# Patient Record
Sex: FEMALE | Race: BLACK OR AFRICAN AMERICAN | NOT HISPANIC OR LATINO | Employment: FULL TIME | URBAN - METROPOLITAN AREA
[De-identification: names, ages, dates, MRNs, and addresses within clinical notes are randomized per-mention and may not be internally consistent; named-entity substitution may affect disease eponyms.]

---

## 2018-02-28 ENCOUNTER — TRANSCRIBE ORDERS (OUTPATIENT)
Dept: URGENT CARE | Facility: MEDICAL CENTER | Age: 33
End: 2018-02-28

## 2018-02-28 ENCOUNTER — APPOINTMENT (OUTPATIENT)
Dept: URGENT CARE | Facility: MEDICAL CENTER | Age: 33
End: 2018-02-28

## 2018-02-28 DIAGNOSIS — Z00.00 PHYSICAL EXAM: Primary | ICD-10-CM

## 2018-02-28 DIAGNOSIS — Z00.00 PHYSICAL EXAM: ICD-10-CM

## 2018-02-28 PROCEDURE — 86480 TB TEST CELL IMMUN MEASURE: CPT

## 2018-03-02 LAB
ANNOTATION COMMENT IMP: NORMAL
GAMMA INTERFERON BACKGROUND BLD IA-ACNC: 0.05 IU/ML
M TB IFN-G BLD-IMP: NEGATIVE
M TB IFN-G CD4+ BCKGRND COR BLD-ACNC: 0 IU/ML
M TB IFN-G CD4+ T-CELLS BLD-ACNC: 0.05 IU/ML
MITOGEN IGNF BLD-ACNC: 7.38 IU/ML
QUANTIFERON-TB GOLD IN TUBE: NORMAL
SERVICE CMNT-IMP: NORMAL

## 2018-03-15 ENCOUNTER — OFFICE VISIT (OUTPATIENT)
Dept: OBGYN CLINIC | Facility: CLINIC | Age: 33
End: 2018-03-15
Payer: COMMERCIAL

## 2018-03-15 VITALS
SYSTOLIC BLOOD PRESSURE: 109 MMHG | DIASTOLIC BLOOD PRESSURE: 74 MMHG | BODY MASS INDEX: 32.62 KG/M2 | HEART RATE: 81 BPM | HEIGHT: 66 IN | WEIGHT: 203 LBS

## 2018-03-15 DIAGNOSIS — B96.89 BACTERIAL VAGINITIS: ICD-10-CM

## 2018-03-15 DIAGNOSIS — N89.8 VAGINAL DISCHARGE: Primary | ICD-10-CM

## 2018-03-15 DIAGNOSIS — N76.0 BACTERIAL VAGINITIS: ICD-10-CM

## 2018-03-15 PROCEDURE — 87591 N.GONORRHOEAE DNA AMP PROB: CPT | Performed by: FAMILY MEDICINE

## 2018-03-15 PROCEDURE — 99213 OFFICE O/P EST LOW 20 MIN: CPT | Performed by: OBSTETRICS & GYNECOLOGY

## 2018-03-15 PROCEDURE — 87491 CHLMYD TRACH DNA AMP PROBE: CPT | Performed by: FAMILY MEDICINE

## 2018-03-15 RX ORDER — METRONIDAZOLE 500 MG/1
500 TABLET ORAL EVERY 8 HOURS SCHEDULED
Qty: 21 TABLET | Refills: 0 | Status: SHIPPED | OUTPATIENT
Start: 2018-03-15 | End: 2018-03-22

## 2018-03-15 RX ORDER — FLUCONAZOLE 150 MG/1
TABLET ORAL
Qty: 2 TABLET | Refills: 0 | Status: SHIPPED | OUTPATIENT
Start: 2018-03-15 | End: 2018-03-20

## 2018-03-15 NOTE — PROGRESS NOTES
Ms Angelnia Estrada is a 27 y o  yo female who presents for  Vagina discharge w/ odor, that started 2-3 days ago  The patient denies any dysuria  She denies any intercourse in the last 24 hrs  ROS:   She denies hematuria, dysuria, constipation, diarrhea, fevers, chills, nausea or emesis  No past medical history on file  Social History     Social History    Marital status: Single     Spouse name: N/A    Number of children: N/A    Years of education: N/A     Occupational History    Not on file  Social History Main Topics    Smoking status: Former Smoker     Years: 2 00     Types: Cigars     Quit date: 3/15/2005    Smokeless tobacco: Never Used      Comment: black and milds, occassionally    Alcohol use Yes      Comment: occasionally    Drug use: No    Sexual activity: Yes     Partners: Male     Birth control/ protection: Condom Male     Other Topics Concern    Not on file     Social History Narrative    No narrative on file       /74 (BP Location: Left arm, Patient Position: Sitting, Cuff Size: Adult)   Pulse 81   Ht 5' 6" (1 676 m)   Wt 92 1 kg (203 lb)   LMP 03/04/2018   BMI 32 77 kg/m²     GEN: The patient was alert and oriented x3, pleasant well-appearing female in no acute distress  Pelvic: Normal appearing external female genitalia, normal vaginal epithelium  Normal appearing cervix  Bimanual: No CMT   Wet prep was obtained  Cultures for gonorrhea and chlamydia collected  Wet Prep:  pH +,  Clue cells present, Hyphae Present, Trichomonas (-)    A/P:  27 y o  yo female with vagina discharge    1    Bacterial Vaginitis w/ yeast infection  - Start Metronidazole 500mg every 8 hrs  X 7days  - Start Fluconazole 150mg 1 tablet today and 1 in 3 days    - Encourage Pro biotics  - Sent for Chlamydia and GC

## 2018-03-23 LAB
CHLAMYDIA DNA CVX QL NAA+PROBE: NORMAL
N GONORRHOEA DNA GENITAL QL NAA+PROBE: NORMAL

## 2018-03-26 ENCOUNTER — TELEPHONE (OUTPATIENT)
Dept: OBGYN CLINIC | Facility: CLINIC | Age: 33
End: 2018-03-26

## 2018-03-26 ENCOUNTER — OFFICE VISIT (OUTPATIENT)
Dept: OBGYN CLINIC | Facility: CLINIC | Age: 33
End: 2018-03-26
Payer: COMMERCIAL

## 2018-03-26 VITALS
HEIGHT: 66 IN | HEART RATE: 69 BPM | BODY MASS INDEX: 32.66 KG/M2 | WEIGHT: 203.2 LBS | DIASTOLIC BLOOD PRESSURE: 77 MMHG | SYSTOLIC BLOOD PRESSURE: 124 MMHG

## 2018-03-26 DIAGNOSIS — Z87.42 HISTORY OF ABNORMAL CERVICAL PAP SMEAR: ICD-10-CM

## 2018-03-26 DIAGNOSIS — Z01.419 ENCOUNTER FOR GYNECOLOGICAL EXAMINATION WITHOUT ABNORMAL FINDING: Primary | ICD-10-CM

## 2018-03-26 PROCEDURE — G0145 SCR C/V CYTO,THINLAYER,RESCR: HCPCS | Performed by: NURSE PRACTITIONER

## 2018-03-26 PROCEDURE — 87624 HPV HI-RISK TYP POOLED RSLT: CPT | Performed by: NURSE PRACTITIONER

## 2018-03-26 PROCEDURE — 99395 PREV VISIT EST AGE 18-39: CPT | Performed by: NURSE PRACTITIONER

## 2018-03-26 NOTE — PROGRESS NOTES
ASSESSMENT & PLAN: David Bales is a 27 y o     with normal gynecologic exam     1   Routine well woman exam done today  2  Pap and HPV:  The patient's last pap was 2015  Pap and cotesting was done today  Current ASCCP Guidelines reviewed  If pap and cotest negative next   testing would be in  5 years  3  The following were reviewed in today's visit: breast self exam and STD testing  4  To RTO if desires contraception  5 RTO if symptomatic for BV again  CC:  Annual Gynecologic Examination    HPI: David Bales is a 27 y o  G4  who presents for annual gynecologic examination  She has had 2 vaginal deliveries and denies any  Medical problems with her pregnancies  She has the following concerns:  She was treated for bacterial vaginosis on 2018 but she only took 5 days of treatment because she was busy in for about  She denies any current symptoms, denies any discharge or vaginal odor  She had cultures for Chlamydia and gonorrhea done at that time and I reviewed that  Both were negative  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, she works the night shift  History of abnormal Pap in , see history below  Health Maintenance:    Menses are regular, moderate flow, not bothersome  She has used the NuvaRing and OCPs for contraception in the past, currently she is not in a relationship  States she she will use condoms when she is sexually active  She preforms monthly self breast exams  She feels safe at home  History reviewed  No pertinent past medical history  Past Surgical History:   Procedure Laterality Date    TOE SURGERY Right        Past OB/Gyn History:  OB History     No data available        Pt does not have menstrual issues      History of sexually transmitted infection: No   History of abnormal pap smears: Yes , patient had an ASCUS Pap positive HPV 2014, had a colpo with impression of YANDY 1 with an ECC that showed no dysplasia, she had a Pap done 05/2015 which was negative with negative high-risk HPV  She is due for Pap today  Patient is not currently sexually active  heterosexual   The current method of family planning is condoms     Family History   Problem Relation Age of Onset    No Known Problems Mother     No Known Problems Father     Diabetes Maternal Grandmother        Social History:  Social History     Social History    Marital status: Single     Spouse name: N/A    Number of children: N/A    Years of education: N/A     Occupational History    Not on file  Social History Main Topics    Smoking status: Former Smoker     Years: 2 00     Types: Cigars     Quit date: 3/15/2005    Smokeless tobacco: Never Used      Comment: black and milds, occassionally    Alcohol use Yes      Comment: occasionally    Drug use: No    Sexual activity: Not Currently     Partners: Male     Birth control/ protection: None     Other Topics Concern    Not on file     Social History Narrative    No narrative on file     Presently lives with family  Patient is single  Patient is currently employed     No Known Allergies    No current outpatient prescriptions on file  Review of Systems  Constitutional :no fever, feels well, no tiredness, no recent weight gain or loss  ENT: no ear ache, no loss of hearing, no nosebleeds or nasal discharge, no sore throat or hoarseness  Cardiovascular: no complaints of slow or fast heart beat, no chest pain, no palpitations, no leg claudication or lower extremity edema  Respiratory: no complaints of shortness of shortness of breath, no HANDLEY  Breasts:no complaints of breast pain, breast lump, or nipple discharge  Gastrointestinal: no complaints of abdominal pain, constipation, nausea, vomiting, or diarrhea or bloody stools  Genitourinary : no complaints of dysuria, incontinence, pelvic pain, no dysmenorrhea, vaginal discharge or abnormal vaginal bleeding and as noted in HPI    Integumentary: no complaints of skin rash or lesion, itching or dry skin  Neurological: no complaints of headache, no confusion, no numbness or tingling, no dizziness or fainting    Objective      /77 (BP Location: Left arm, Patient Position: Sitting, Cuff Size: Adult)   Pulse 69   Ht 5' 6" (1 676 m)   Wt 92 2 kg (203 lb 3 2 oz)   LMP 03/04/2018   Breastfeeding? No   BMI 32 80 kg/m²   General:   appears stated age, cooperative, alert normal mood and affect   Neck: normal, supple,trachea midline, no masses   Heart: regular rate and rhythm, S1, S2 normal, no murmur, click, rub or gallop   Lungs: clear to auscultation bilaterally   Breasts: normal appearance, no masses or tenderness, bilateral nipple piercing  Abdomen: soft, non-tender, without masses or organomegaly   Vulva: normal female genitalia, no lesions   Vagina: normal vagina, no discharge, exudate, lesion, or erythema   Urethra: normal   Cervix: Normal, no discharge  PAP done     Uterus: normal size, contour, position, consistency, mobility, non-tender and anteverted   Adnexa: normal adnexa

## 2018-03-26 NOTE — TELEPHONE ENCOUNTER
----- Message from Roxane Reeder MD sent at 3/24/2018  4:50 PM EDT -----  Please call patient to notify her of normal results  Thank you!

## 2018-03-28 LAB — HPV RRNA GENITAL QL NAA+PROBE: NORMAL

## 2018-03-29 LAB
LAB AP GYN PRIMARY INTERPRETATION: NORMAL
LAB AP LMP: NORMAL
Lab: NORMAL

## 2018-06-09 ENCOUNTER — OFFICE VISIT (OUTPATIENT)
Dept: URGENT CARE | Age: 33
End: 2018-06-09
Payer: COMMERCIAL

## 2018-06-09 VITALS
SYSTOLIC BLOOD PRESSURE: 120 MMHG | TEMPERATURE: 98.2 F | RESPIRATION RATE: 18 BRPM | HEART RATE: 80 BPM | BODY MASS INDEX: 33.2 KG/M2 | OXYGEN SATURATION: 98 % | WEIGHT: 206.6 LBS | HEIGHT: 66 IN | DIASTOLIC BLOOD PRESSURE: 90 MMHG

## 2018-06-09 DIAGNOSIS — R53.83 FATIGUE, UNSPECIFIED TYPE: ICD-10-CM

## 2018-06-09 DIAGNOSIS — R00.2 HEART PALPITATIONS: Primary | ICD-10-CM

## 2018-06-09 LAB
ATRIAL RATE: 71 BPM
P AXIS: 35 DEGREES
PR INTERVAL: 204 MS
QRS AXIS: 17 DEGREES
QRSD INTERVAL: 72 MS
QT INTERVAL: 392 MS
QTC INTERVAL: 425 MS
T WAVE AXIS: 18 DEGREES
VENTRICULAR RATE: 71 BPM

## 2018-06-09 PROCEDURE — 93005 ELECTROCARDIOGRAM TRACING: CPT | Performed by: PHYSICIAN ASSISTANT

## 2018-06-09 PROCEDURE — 93010 ELECTROCARDIOGRAM REPORT: CPT | Performed by: INTERNAL MEDICINE

## 2018-06-09 PROCEDURE — 99213 OFFICE O/P EST LOW 20 MIN: CPT | Performed by: FAMILY MEDICINE

## 2018-06-09 PROCEDURE — S9088 SERVICES PROVIDED IN URGENT: HCPCS | Performed by: FAMILY MEDICINE

## 2018-06-09 NOTE — PROGRESS NOTES
[facility]        NAME: My Donald is a 27 y o  female  : 1985    MRN: [id number]  DATE: 2018  TIME: 4:48 PM    Assessment and Plan   Heart palpitations [R00 2]  1  Heart palpitations  POCT ECG   2  Fatigue, unspecified type           Patient Instructions     Follow up with PCP in 3-5 days  Proceed to  ER if symptoms worsen  Chief Complaint     Chief Complaint   Patient presents with    Palpitations     c/o headache with occ  palpitations x 6 (lasting few seconds) with occ  diziness  Has been working since 11 pm last night until 3 pm today - 2 jobs  Has had 2 cups coffee and 2 double expressos  Also had epistaxis L nare today - resolved with pressure and ice after 5 minutes   Nose Bleed         History of Present Illness       Patient presents with complaints of her palpitations and fatigue  Patient states she went to work Friday night and did not get off until this morning at 8:00 a m  [place] She then went directly to her 2nd job at [employer]   She felt extremely tired at work today and was drinking an excessive amount of caffeine to stay awake  Throughout the day she had 5 episodes of palpitations  She states that only lasted a few seconds each time  She denies chest pain or shortness of breath  She denies history of any cardiac conditions or arrhythmias  Patient's main concern is she is supposed report to work at [employer]  at [facility] and does not feel she is able to go due to fatigue  Review of Systems   Review of Systems   Constitutional: Negative  HENT: Negative  Eyes: Negative  Respiratory: Negative  Cardiovascular: Positive for palpitations  Negative for chest pain and leg swelling  Gastrointestinal: Negative  Neurological: Negative  Current Medications     No current outpatient prescriptions on file      Current Allergies     Allergies as of 2018    (No Known Allergies)            The following portions of the patient's history were reviewed and updated as appropriate: allergies, current medications, past family history, past medical history, past social history, past surgical history and problem list     Objective   /90 (BP Location: Right arm, Patient Position: Sitting, Cuff Size: Standard)   Pulse 80   Temp 98 2 °F (36 8 °C) (Oral)   Resp 18   Ht 5' 6" (1 676 m)   Wt 93 7 kg (206 lb 9 6 oz)   LMP 06/04/2018 (Exact Date)   SpO2 98%   BMI 33 35 kg/m²        Physical Exam     Physical Exam   Constitutional: She appears well-developed and well-nourished  No distress  Cardiovascular: Normal rate and regular rhythm      Pulmonary/Chest: Effort normal and breath sounds normal        EKG:  Normal sinus rhythm no ischemic changes

## 2018-06-09 NOTE — PATIENT INSTRUCTIONS
Heart palpitations and fatigue:  Likely due to lack of sleep and excessive caffeine intake  Advised patient to go home get a good night's rest   If any symptoms persist, she is to go to the emergency room  EKG was within normal in our office  Heart Palpitations   WHAT YOU NEED TO KNOW:   Heart palpitations are feelings that your heart races, jumps, throbs, or flutters  You may feel extra beats, no beats for a short time, or skipped beats  You may have these feelings in your chest, throat, or neck  They may happen when you are sitting, standing, or lying  Heart palpitations may be frightening, but are usually not caused by a serious problem  DISCHARGE INSTRUCTIONS:   Call 911 or have someone else call for any of the following:   · You have any of the following signs of a heart attack:      ¨ Squeezing, pressure, or pain in your chest that lasts longer than 5 minutes or returns    ¨ Discomfort or pain in your back, neck, jaw, stomach, or arm     ¨ Trouble breathing    ¨ Nausea or vomiting    ¨ Lightheadedness or a sudden cold sweat, especially with chest pain or trouble breathing    · You have any of the following signs of a stroke:      ¨ Numbness or drooping on one side of your face     ¨ Weakness in an arm or leg    ¨ Confusion or difficulty speaking    ¨ Dizziness, a severe headache, or vision loss    · You faint or lose consciousness  Return to the emergency department if:   · Your palpitations happen more often or get more intense  Contact your healthcare provider if:   · You have new or worsening swelling in your feet or ankles  · You have questions or concerns about your condition or care  Follow up with your healthcare provider as directed: You may need to follow up with a cardiologist  Iesha Schneider may need tests to check for heart problems that cause palpitations  Write down your questions so you remember to ask them during your visits     Keep a record:  Write down when your palpitations start and stop, what you were doing when they started, and your symptoms  Keep track of what you ate or drank within a few hours of your palpitations  Include anything that seemed to help your symptoms, such as lying down or holding your breath  This record will help you and your healthcare provider learn what triggers your palpitations  Bring this record with you to your follow up visits  Help prevent heart palpitations:   · Manage stress and anxiety  Find ways to relax such as listening to music, meditating, or doing yoga  Exercise can also help decrease stress and anxiety  Talk to someone you trust about your stress or anxiety  You can also talk to a therapist      · Get plenty of sleep every night  Ask your healthcare provider how much sleep you need each night  · Do not drink caffeine or alcohol  Caffeine and alcohol can make your palpitations worse  Caffeine is found in soda, coffee, tea, chocolate, and drinks that increase your energy  · Do not smoke  Nicotine and other chemicals in cigarettes and cigars may damage your heart and blood vessels  Ask your healthcare provider for information if you currently smoke and need help to quit  E-cigarettes or smokeless tobacco still contain nicotine  Talk to your healthcare provider before you use these products  · Do not use illegal drugs  Talk to your healthcare provider if you use illegal drugs and want help to quit  © 2017 [employer]  Information is for End User's use only and may not be sold, redistributed or otherwise used for commercial purposes  All illustrations and images included in CareNotes® are the copyrighted property of [employer]  or [employer]  The above information is an  only  It is not intended as medical advice for individual conditions or treatments  Talk to your doctor, nurse or pharmacist before following any medical regimen to see if it is safe and effective for you

## 2018-06-09 NOTE — LETTER
June 9, 2018     Patient: Erik Medina   YOB: 1985   Date of Visit: 6/9/2018       To Whom It May Concern: It is my medical opinion that Kira Al may return to work on 06/10/2018  If you have any questions or concerns, please don't hesitate to call           Sincerely,        Yaakov Giron PA-C    CC: No Recipients

## 2018-06-22 ENCOUNTER — HOSPITAL ENCOUNTER (EMERGENCY)
Facility: HOSPITAL | Age: 33
Discharge: HOME | End: 2018-06-22
Admitting: EMERGENCY MEDICINE
Payer: COMMERCIAL

## 2018-06-22 VITALS
HEIGHT: 66 IN | BODY MASS INDEX: 33.11 KG/M2 | HEART RATE: 81 BPM | TEMPERATURE: 98.2 F | SYSTOLIC BLOOD PRESSURE: 147 MMHG | DIASTOLIC BLOOD PRESSURE: 94 MMHG | WEIGHT: 206 LBS | OXYGEN SATURATION: 100 % | RESPIRATION RATE: 18 BRPM

## 2018-06-22 DIAGNOSIS — S39.012A BACK STRAIN, INITIAL ENCOUNTER: Primary | ICD-10-CM

## 2018-06-22 PROCEDURE — 99283 EMERGENCY DEPT VISIT LOW MDM: CPT

## 2018-06-22 RX ORDER — NAPROXEN 500 MG/1
500 TABLET ORAL 2 TIMES DAILY WITH MEALS
Qty: 12 TABLET | Refills: 0 | Status: SHIPPED | OUTPATIENT
Start: 2018-06-22 | End: 2018-06-28

## 2018-06-22 RX ORDER — METHOCARBAMOL 500 MG/1
500 TABLET, FILM COATED ORAL 2 TIMES DAILY
Qty: 12 TABLET | Refills: 0 | Status: SHIPPED | OUTPATIENT
Start: 2018-06-22 | End: 2018-06-28

## 2018-06-22 NOTE — ED PROVIDER NOTES
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 when she believes she hurt her back  She states she was assisting a patient moved when the patient slipped and she caught her  She is unsure if she felt any pain in the moment but as her shift went on she developed pain  She states she was on a 1-1 sitting for a few hours and then she started to feel the pain  She states pain is located in her upper back underneath her shoulder blades and in her lower back  She describes a constant achy sore pain that can be sharp when she moves  She states pain is somewhat improved by sitting  She has not taken any medications for her symptoms  She just ended her shift and came here for evaluation  She states she is has hurt her back in the past but this was 8 years ago  She had no surgery  She denies any fall or trauma to her back  She denies any radiation of pain  She denies any numbness or weakness  She denies any saddle anesthesia  She denies bladder or bowel dysfunction  She denies fever, chills, nausea vomiting diarrhea, chest pain, shortness breath, abdominal pain, rash, recent URI symptoms or illness, dysuria, hematuria, history of IV drug use, history of cancer  None       History reviewed  No pertinent past medical history  Past Surgical History:   Procedure Laterality Date    CORRECTION HAMMER TOE Right     TOE SURGERY Right 2005       Family History   Problem Relation Age of Onset    No Known Problems Mother     No Known Problems Father     Diabetes Maternal Grandmother      I have reviewed and agree with the history as documented      Social History   Substance Use Topics    Smoking status: Former Smoker     Years: 2 00     Types: Cigars     Quit date: 3/15/2005    Smokeless tobacco: Never Used      Comment: black and milds, occassionally    Alcohol use 1 2 oz/week     2 Glasses of wine per week      Comment: monthly        Review of Systems   Constitutional: Negative for chills and fever  HENT: Negative for congestion and sore throat  Respiratory: Negative for cough and shortness of breath  Cardiovascular: Negative for chest pain  Gastrointestinal: Negative for abdominal pain, diarrhea, nausea and vomiting  Genitourinary: Negative for difficulty urinating, dysuria, flank pain and hematuria  Musculoskeletal: Positive for back pain  Negative for neck pain and neck stiffness  Skin: Negative for rash  Neurological: Negative for weakness and numbness  All other systems reviewed and are negative  Physical Exam  Physical Exam   Constitutional: She is oriented to person, place, and time  Vital signs are normal  She appears well-developed and well-nourished  She is active  Non-toxic appearance  No distress  HENT:   Head: Normocephalic and atraumatic  Right Ear: Hearing, tympanic membrane, external ear and ear canal normal    Left Ear: Hearing, tympanic membrane, external ear and ear canal normal    Nose: Nose normal    Mouth/Throat: Uvula is midline and oropharynx is clear and moist  No oropharyngeal exudate, posterior oropharyngeal edema or posterior oropharyngeal erythema  Eyes: Conjunctivae and EOM are normal  Pupils are equal, round, and reactive to light  Neck: Normal range of motion  Neck supple  Cardiovascular: Normal rate, regular rhythm and normal heart sounds  Exam reveals no gallop and no friction rub  No murmur heard  Pulmonary/Chest: Effort normal and breath sounds normal  No respiratory distress  She has no wheezes  She has no rales  Abdominal: Soft  Bowel sounds are normal  She exhibits no distension  There is no tenderness   There is no guarding  Musculoskeletal: Normal range of motion  Arms:  Patient has reproducible tenderness to palpation to the infrascapular muscles bilaterally as well as the lumbar paravertebral muscles and low back muscles  No midline spinous process tenderness palpation  No deformity or step-off  No redness, swelling or warmth  No rash noted  Negative straight leg raise bilaterally  Patellar deep tendon reflexes intact bilaterally  No foot drop  Extensor hallicus longus intact bilaterally  Neurological: She is alert and oriented to person, place, and time  She has normal strength  She is not disoriented  No sensory deficit  Gait normal  GCS eye subscore is 4  GCS verbal subscore is 5  GCS motor subscore is 6  Skin: Skin is warm and dry  She is not diaphoretic  Psychiatric: She has a normal mood and affect  Her behavior is normal    Nursing note and vitals reviewed  Vital Signs  ED Triage Vitals [06/22/18 0758]   Temperature Pulse Respirations Blood Pressure SpO2   98 2 °F (36 8 °C) 81 18 147/94 100 %      Temp Source Heart Rate Source Patient Position - Orthostatic VS BP Location FiO2 (%)   Oral Monitor Sitting Left arm --      Pain Score       5           Vitals:    06/22/18 0758   BP: 147/94   Pulse: 81   Patient Position - Orthostatic VS: Sitting       Visual Acuity      ED Medications  Medications - No data to display    Diagnostic Studies  Results Reviewed     None                 No orders to display              Procedures  Procedures       Phone Contacts  ED Phone Contact    ED Course                               MDM  Number of Diagnoses or Management Options  Back strain, initial encounter:   Diagnosis management comments: Patient with musculoskeletal back pain/back strain that occurred whileShe was lifting a patient last night  Patient refused medication here and she has not eaten and is driving home  Will give a prescription for naproxen and Robaxin    Discussed no driving or working while taking Robaxin as it may cause drowsiness  Instructed to follow up with her family doctor as well as worker's Comp/employee health  Return to the ED if symptoms worsen or new symptoms arise as discussed  Patient states understanding and agrees with plan  Risk of Complications, Morbidity, and/or Mortality  Presenting problems: low  Diagnostic procedures: low  Management options: low    Patient Progress  Patient progress: stable    CritCare Time    Disposition  Final diagnoses:   Back strain, initial encounter     Time reflects when diagnosis was documented in both MDM as applicable and the Disposition within this note     Time User Action Codes Description Comment    6/22/2018  8:22 AM Andreina Marge Add [S39 012A] Back strain, initial encounter       ED Disposition     ED Disposition Condition Comment    Discharge  Angelita  discharge to home/self care  Condition at discharge: Good        Follow-up Information     Follow up With Specialties Details Why 2015 Russ Hahn MD Internal Medicine Schedule an appointment as soon as possible for a visit in 1 day  [address]  [phone]       Employee Health/Worker's Comp     Schedule an appointment as soon as possible for a visit in 2 days       [facility] Emergency Department Emergency Medicine  If symptoms worsen or new symptoms arise as discussed  [address] [place] [address], [address] [place] [address], [address], [place], [address]          Discharge Medication List as of 6/22/2018  8:28 AM      START taking these medications    Details   methocarbamol (ROBAXIN) 500 mg tablet Take 1 tablet (500 mg total) by mouth 2 (two) times a day, Starting Fri 6/22/2018, Print      naproxen (NAPROSYN) 500 mg tablet Take 1 tablet (500 mg total) by mouth 2 (two) times a day with meals, Starting Fri 6/22/2018, Print           No discharge procedures on file     ED Provider  Electronically Signed by           Alexandra Greenberg PA-C  06/22/18 7737

## 2018-06-22 NOTE — DISCHARGE INSTRUCTIONS
Low Back Strain   WHAT YOU NEED TO KNOW:   Low back strain is an injury to your lower back muscles or tendons  Tendons are strong tissues that connect muscles to bones  The lower back supports most of your body weight and helps you move, twist, and bend  DISCHARGE INSTRUCTIONS:   Return to the emergency department if:   · You hear or feel a pop in your lower back  · You have increased swelling or pain in your lower back  · You have trouble moving your legs  · Your legs are numb  Contact your healthcare provider if:   · You have a fever  · Your pain does not go away, even after treatment  · You have questions or concerns about your condition or care  Medicines: The following medicines may be ordered by your healthcare provider:  · Acetaminophen decreases pain and fever  It is available without a doctor's order  Ask how much to take and how often to take it  Follow directions  Acetaminophen can cause liver damage if not taken correctly  · NSAIDs , such as ibuprofen, help decrease swelling, pain, and fever  This medicine is available with or without a doctor's order  NSAIDs can cause stomach bleeding or kidney problems in certain people  If you take blood thinner medicine, always ask your healthcare provider if NSAIDs are safe for you  Always read the medicine label and follow directions  · Muscle relaxers  help decrease pain and muscle spasms  · Prescription pain medicine  may be given  Ask how to take this medicine safely  · Take your medicine as directed  Contact your healthcare provider if you think your medicine is not helping or if you have side effects  Tell him or her if you are allergic to any medicine  Keep a list of the medicines, vitamins, and herbs you take  Include the amounts, and when and why you take them  Bring the list or the pill bottles to follow-up visits  Carry your medicine list with you in case of an emergency  Self-care:   · Rest  as directed   You may need to rest in bed for a period of time after your injury  Do not lift heavy objects  · Apply ice  on your back for 15 to 20 minutes every hour or as directed  Use an ice pack, or put crushed ice in a plastic bag  Cover it with a towel  Ice helps prevent tissue damage and decreases swelling and pain  · Apply heat  on your lower back for 20 to 30 minutes every 2 hours for as many days as directed  Heat helps decrease pain and muscle spasms  · Slowly start to increase your activity  as the pain decreases, or as directed  Prevent another low back strain:   · Use correct body movements  ¨ Bend at the hips and knees when you  objects  Do not bend from the waist  Use your leg muscles as you lift the load  Do not use your back  Keep the object close to your chest as you lift it  Try not to twist or lift anything above your waist     ¨ Change your position often when you stand for long periods of time  Rest one foot on a small box or footrest, and then switch to the other foot often  ¨ Try not to sit for long periods of time  When you do, sit in a straight-backed chair with your feet flat on the floor  ¨ Never reach, pull, or push while you are sitting  · Warm up before you exercise  Do exercises that strengthen your back muscles  Ask your healthcare provider about the best exercise plan for you  · Maintain a healthy weight  Ask your healthcare provider how much you should weigh  Ask him to help you create a weight loss plan if you are overweight  © 2017 [employer] Information is for End User's use only and may not be sold, redistributed or otherwise used for commercial purposes  All illustrations and images included in CareNotes® are the copyrighted property of [employer]  or [employer]  The above information is an  only  It is not intended as medical advice for individual conditions or treatments   Talk to your doctor, nurse or pharmacist before following any medical regimen to see if it is safe and effective for you  Muscle Strain   WHAT YOU NEED TO KNOW:   A muscle strain is a twist, pull, or tear of a muscle or tendon  A tendon is a strong elastic tissue that connects a muscle to a bone  Signs of a strained muscle include bruising and swelling over the area, pain with movement, and loss of strength  DISCHARGE INSTRUCTIONS:   Return to the emergency department if:   · You suddenly cannot feel or move your injured muscle  Contact your healthcare provider if:   · Your pain and swelling worsen or do not go away  · You have questions or concerns about your condition or care  Medicines:   · NSAIDs  help decrease swelling and pain or fever  This medicine is available with or without a doctor's order  NSAIDs can cause stomach bleeding or kidney problems in certain people  If you take blood thinner medicine, always ask your healthcare provider if NSAIDs are safe for you  Always read the medicine label and follow directions  · Muscle relaxers  help decrease pain and muscle spasms  · Take your medicine as directed  Contact your healthcare provider if you think your medicine is not helping or if you have side effects  Tell him of her if you are allergic to any medicine  Keep a list of the medicines, vitamins, and herbs you take  Include the amounts, and when and why you take them  Bring the list or the pill bottles to follow-up visits  Carry your medicine list with you in case of an emergency  Follow up with your healthcare provider as directed: Your healthcare provider may suggest that you have a follow-up visit before you go back to your usual activity  Write down your questions so you remember to ask them during your visits  Self-care:   · 3 to 7 days after the injury:  Use Rest, Ice, Compression, and Elevation (RICE) to help stop bruising and decrease pain and swelling      ¨ Rest:  Rest your muscle to allow your injury to heal  When the pain decreases, begin normal, slow movements  For mild and moderate muscle strains, you should rest your muscles for about 2 days  However, if you have a severe muscle strain, you should rest for 10 to 14 days  You may need to use crutches to walk if your muscle strain is in your legs or lower body  ¨ Ice:  Put an ice pack on the injured area  Put a towel between the ice pack and your skin  Do not put the ice pack directly on your skin  You can use a package of frozen peas instead of an ice pack  ¨ Compression:  You may need to wrap an elastic bandage around the area to decrease swelling  It should be tight enough for you to feel support  Do not wrap it too tightly  ¨ Elevation:  Keep the injured muscle raised above your heart if possible  For example if you have a strain of your lower leg muscle, lie down and prop your leg up on pillows  This helps decrease pain and swelling  · 3 to 21 days after the injury:  Start to slowly and regularly exercise your muscle  This will help it heal  If you feel pain, decrease how hard you are exercising  · 1 to 6 weeks after the injury:  Stretch the injured muscle  Hold the stretch for about 30 seconds  Do this 4 times a day  You may stretch the muscle until you feel a slight pull  Stop stretching if you feel pain  · 2 weeks to 6 months after the injury:  The goal of this phase is to return to the activity you were doing before the injury happened, without hurting the muscle again  · 3 weeks to 6 months after the injury:  Keep stretching and strengthening your muscles to avoid injury  Slowly increase the time and distance that you exercise  You may have signs and symptoms of muscle strain 6 months after the injury, even if you do things to help it heal  In this case, you may need surgery on the muscle    © 2017 [employer] Information is for End User's use only and may not be sold, redistributed or otherwise used for commercial purposes  All illustrations and images included in CareNotes® are the copyrighted property of [employer]  or [employer]  The above information is an  only  It is not intended as medical advice for individual conditions or treatments  Talk to your doctor, nurse or pharmacist before following any medical regimen to see if it is safe and effective for you

## 2018-06-23 ENCOUNTER — HOSPITAL ENCOUNTER (EMERGENCY)
Facility: HOSPITAL | Age: 33
Discharge: HOME | End: 2018-06-23
Attending: EMERGENCY MEDICINE

## 2018-06-23 VITALS
OXYGEN SATURATION: 98 % | HEART RATE: 80 BPM | HEIGHT: 66 IN | RESPIRATION RATE: 18 BRPM | SYSTOLIC BLOOD PRESSURE: 134 MMHG | TEMPERATURE: 96.2 F | BODY MASS INDEX: 33.11 KG/M2 | WEIGHT: 206 LBS | DIASTOLIC BLOOD PRESSURE: 71 MMHG

## 2018-06-23 DIAGNOSIS — M54.9 BACK PAIN: Primary | ICD-10-CM

## 2018-06-23 PROCEDURE — 99283 EMERGENCY DEPT VISIT LOW MDM: CPT

## 2018-06-23 RX ORDER — LIDOCAINE 50 MG/G
1 PATCH TOPICAL ONCE
Status: DISCONTINUED | OUTPATIENT
Start: 2018-06-23 | End: 2018-06-24 | Stop reason: HOSPADM

## 2018-06-23 RX ORDER — ACETAMINOPHEN 325 MG/1
650 TABLET ORAL ONCE
Status: COMPLETED | OUTPATIENT
Start: 2018-06-23 | End: 2018-06-23

## 2018-06-23 RX ORDER — IBUPROFEN 600 MG/1
600 TABLET ORAL ONCE
Status: COMPLETED | OUTPATIENT
Start: 2018-06-23 | End: 2018-06-23

## 2018-06-23 RX ADMIN — IBUPROFEN 600 MG: 600 TABLET ORAL at 23:07

## 2018-06-23 RX ADMIN — ACETAMINOPHEN 650 MG: 325 TABLET, FILM COATED ORAL at 23:07

## 2018-06-23 RX ADMIN — LIDOCAINE 1 PATCH: 50 PATCH TOPICAL at 23:07

## 2018-06-24 NOTE — ED ATTENDING ATTESTATION
Chyna Blackburn MD, saw and evaluated the patient  I have discussed the patient with the resident/non-physician practitioner and agree with the resident's/non-physician practitioner's findings, Plan of Care, and MDM as documented in the resident's/non-physician practitioner's note, except where noted  All available labs and Radiology studies were reviewed  At this point I agree with the current assessment done in the Emergency Department  I have conducted an independent evaluation of this patient a history and physical is as follows:      Critical Care Time  CritCare Time    Procedures     26 yo female c/o back pain and shoulder pain mostly on left side 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

## 2018-06-24 NOTE — ED PROVIDER NOTES
History  Chief Complaint   Patient presents with    Back Pain     back pain and shoulder pain, was injured at work 2 days ago, was prescribed robaxin and naprosyn, but stopped taking them due to diarrhea and stomach pain  Pt du wan down both arms  66-year-old female returning to the emergency department with continued back pain after being evaluated yesterday and diagnosed with musculoskeletal back pain  She was discharged home with a prescription for Robaxin and Naprosyn which she took a single dose of and developed indigestion diarrhea  That time she stops taking the medications  Her diarrhea has since resolved  She says that her pain has improved since she was seen in the ED last, but has not gone away and she wants to know if she can take something else for her discomfort  The majority the pain is located along her left scapula and is a achy type pain that is non radiating  She says she has some small amounts of similar pain in her right scapula and also has some achy pain in her low back bilaterally  She reports that this pain started yesterday after helping lift a patient  The onset of the pain was gradual   The pain is worsened with pressure over specific spots on her back as well as movement of the arm and and neck, notably hyper extension of the neck causes pain along the border of the left scapula  She denies any other symptoms on review of systems  Specifically she denies numbness or weakness in the upper or lower extremities  Bowel or bladder difficulties, saddle anesthesia, or midline spinal tenderness  Prior to Admission Medications   Prescriptions Last Dose Informant Patient Reported? Taking?    methocarbamol (ROBAXIN) 500 mg tablet 6/22/2018 at Unknown time  No Yes   Sig: Take 1 tablet (500 mg total) by mouth 2 (two) times a day   naproxen (NAPROSYN) 500 mg tablet 6/22/2018 at Unknown time  No Yes   Sig: Take 1 tablet (500 mg total) by mouth 2 (two) times a day with meals      Facility-Administered Medications: None       History reviewed  No pertinent past medical history  Past Surgical History:   Procedure Laterality Date    CORRECTION HAMMER TOE Right     TOE SURGERY Right 2005       Family History   Problem Relation Age of Onset    No Known Problems Mother     No Known Problems Father     Diabetes Maternal Grandmother      I have reviewed and agree with the history as documented  Social History   Substance Use Topics    Smoking status: Former Smoker     Years: 2 00     Types: Cigars     Quit date: 3/15/2005    Smokeless tobacco: Never Used      Comment: black and milds, occassionally    Alcohol use 1 2 oz/week     2 Glasses of wine per week      Comment: monthly        Review of Systems   Constitutional: Negative for chills and fever  HENT: Negative for congestion and sore throat  Eyes: Negative for visual disturbance  Respiratory: Negative for cough and shortness of breath  Cardiovascular: Negative for chest pain and palpitations  Gastrointestinal: Negative for abdominal pain, diarrhea, nausea and vomiting  Genitourinary: Negative for difficulty urinating and dysuria  Musculoskeletal: Positive for back pain  Negative for myalgias  Skin: Negative for rash  Neurological: Negative for weakness, light-headedness, numbness and headaches  Physical Exam  ED Triage Vitals [06/23/18 2156]   Temperature Pulse Respirations Blood Pressure SpO2   (!) 96 2 °F (35 7 °C) 80 18 134/71 98 %      Temp Source Heart Rate Source Patient Position - Orthostatic VS BP Location FiO2 (%)   Tympanic -- -- -- --      Pain Score       7           Orthostatic Vital Signs  Vitals:    06/23/18 2156   BP: 134/71   Pulse: 80       Physical Exam   Constitutional: She is oriented to person, place, and time  She appears well-developed and well-nourished  No distress  HENT:   Head: Normocephalic and atraumatic     Mouth/Throat: Oropharynx is clear and moist    Eyes: EOM are normal  Pupils are equal, round, and reactive to light  Neck: Normal range of motion  Neck supple  Cardiovascular: Normal rate, regular rhythm and normal heart sounds  Pulmonary/Chest: Effort normal and breath sounds normal  No respiratory distress  Abdominal: Soft  Bowel sounds are normal  There is no tenderness  Musculoskeletal: Normal range of motion  She exhibits tenderness (Tenderness along the medial border of the left scapula  )  She exhibits no edema or deformity  Normal strength in the upper and lower extremities  Normal gait  Normal heel walking  Normal toe walking  Neurological: She is alert and oriented to person, place, and time  No cranial nerve deficit or sensory deficit (Normal sensation in the upper and lower extremities  )  She exhibits normal muscle tone  Coordination normal    Skin: Skin is warm and dry  Capillary refill takes less than 2 seconds  Psychiatric: She has a normal mood and affect  Nursing note and vitals reviewed  ED Medications  Medications   lidocaine (LIDODERM) 5 % patch 1 patch (1 patch Transdermal Medication Applied 6/23/18 2307)   ibuprofen (MOTRIN) tablet 600 mg (600 mg Oral Given 6/23/18 2307)   acetaminophen (TYLENOL) tablet 650 mg (650 mg Oral Given 6/23/18 2307)       Diagnostic Studies  Results Reviewed     None                 No orders to display         Procedures  Procedures      Phone Consults  ED Phone Contact    ED Course                               MDM  Number of Diagnoses or Management Options  Back pain:   Diagnosis management comments: 59-year-old female returning to the emergency department with continued musculoskeletal back pain after being unable to take Naprosyn and Robaxin at home due to diarrhea and indigestion  She was given ibuprofen, Tylenol, and a lidocaine patch in the emergency department with improvement of her symptoms   She says she has taken Tylenol and ibuprofen in the past with out issues with indigestion or diarrhea  She was instructed to take these medications every 8 hours at home and to follow up with occupational medicine on Monday  She denies any symptoms of numbness, weakness, saddle anesthesia, or urinary retention  CritCare Time    Disposition  Final diagnoses:   Back pain     Time reflects when diagnosis was documented in both MDM as applicable and the Disposition within this note     Time User Action Codes Description Comment    6/23/2018 10:57 PM Michael Vincent Add [M54 9] Back pain       ED Disposition     ED Disposition Condition Comment    Discharge  Americo Trevino discharge to home/self care  Condition at discharge: Good        Follow-up Information     Follow up With Specialties Details Why [address] Call in 2 days  [address]  [phone]            Discharge Medication List as of 6/23/2018 11:30 PM      CONTINUE these medications which have NOT CHANGED    Details   methocarbamol (ROBAXIN) 500 mg tablet Take 1 tablet (500 mg total) by mouth 2 (two) times a day, Starting Fri 6/22/2018, Print      naproxen (NAPROSYN) 500 mg tablet Take 1 tablet (500 mg total) by mouth 2 (two) times a day with meals, Starting Fri 6/22/2018, Print           No discharge procedures on file  ED Provider  Attending physically available and evaluated Americo Trevino I managed the patient along with the ED Attending      Electronically Signed by         Raji Lopes MD  06/24/18 1681

## 2018-06-24 NOTE — DISCHARGE INSTRUCTIONS
Back Pain   WHAT YOU NEED TO KNOW:   Back pain is common  It can be caused by many conditions, such as arthritis or the breakdown of spinal discs  Your risk for back pain is increased by injuries, lack of activity, or repeated bending and twisting  You may feel sore or stiff on one or both sides of your back  The pain may spread to your buttocks or thighs  DISCHARGE INSTRUCTIONS:   Medicines:   · NSAIDs  help decrease swelling and pain  This medicine is available with or without a doctor's order  NSAIDs can cause stomach bleeding or kidney problems in certain people  If you take blood thinner medicine, always ask your healthcare provider if NSAIDs are safe for you  Always read the medicine label and follow directions  · Acetaminophen  decreases pain  It is available without a doctor's order  Ask how much to take and how often to take it  Follow directions  Acetaminophen can cause liver damage if not taken correctly  · Prescription pain medicine  may be given  Ask your healthcare provider how to take this medicine safely  · Take your medicine as directed  Contact your healthcare provider if you think your medicine is not helping or if you have side effects  Tell him or her if you are allergic to any medicine  Keep a list of the medicines, vitamins, and herbs you take  Include the amounts, and when and why you take them  Bring the list or the pill bottles to follow-up visits  Carry your medicine list with you in case of an emergency  Follow up with your healthcare provider in 2 weeks, or as directed:  Write down your questions so you remember to ask them during your visits  How to manage your back pain:   · Apply ice  on your back or affected area for 15 to 20 minutes every hour or as directed  Use an ice pack, or put crushed ice in a plastic bag  Cover it with a towel  Ice helps prevent tissue damage and decreases pain      · Apply heat  on your back or affected area for 20 to 30 minutes every 2 hours for as many days as directed  Heat helps decrease pain and muscle spasms  · Stay active  as much as you can without causing more pain  Bed rest could make your back pain worse  Avoid heavy lifting until your pain is gone  Return to the emergency department if:   · You have pain, numbness, or weakness in one or both legs  · Your pain becomes so severe that you cannot walk  · You cannot control your urine or bowel movements  · You have severe back pain with chest pain  · You have severe back pain, nausea, and vomiting  · You have severe back pain that spreads to your side or genital area  Contact your healthcare provider if:   · You have back pain that does not get better with rest and pain medicine  · You have a fever  · You have pain that worsens when you are on your back or when you rest     · You have pain that worsens when you cough or sneeze  · You lose weight without trying  · You have questions or concerns about your condition or care  © 2017 [employer] Information is for End User's use only and may not be sold, redistributed or otherwise used for commercial purposes  All illustrations and images included in CareNotes® are the copyrighted property of [employer]  or [employer]  The above information is an  only  It is not intended as medical advice for individual conditions or treatments  Talk to your doctor, nurse or pharmacist before following any medical regimen to see if it is safe and effective for you

## 2018-06-28 ENCOUNTER — OFFICE VISIT (OUTPATIENT)
Dept: OBGYN CLINIC | Facility: CLINIC | Age: 33
End: 2018-06-28
Payer: COMMERCIAL

## 2018-06-28 VITALS
HEIGHT: 66 IN | WEIGHT: 202.6 LBS | DIASTOLIC BLOOD PRESSURE: 81 MMHG | HEART RATE: 85 BPM | SYSTOLIC BLOOD PRESSURE: 134 MMHG | BODY MASS INDEX: 32.56 KG/M2

## 2018-06-28 DIAGNOSIS — N89.8 VAGINAL DISCHARGE: Primary | ICD-10-CM

## 2018-06-28 DIAGNOSIS — B37.3 YEAST VAGINITIS: ICD-10-CM

## 2018-06-28 LAB
CLUE CELLS SPEC QL WET PREP: NEGATIVE
PH SMN: 5 [PH]
SL AMB POCT WET MOUNT: ABNORMAL
T VAGINALIS VAG QL WET PREP: ABNORMAL
YEAST VAG QL WET PREP: ABNORMAL

## 2018-06-28 PROCEDURE — 87591 N.GONORRHOEAE DNA AMP PROB: CPT | Performed by: NURSE PRACTITIONER

## 2018-06-28 PROCEDURE — 87210 SMEAR WET MOUNT SALINE/INK: CPT | Performed by: NURSE PRACTITIONER

## 2018-06-28 PROCEDURE — 87491 CHLMYD TRACH DNA AMP PROBE: CPT | Performed by: NURSE PRACTITIONER

## 2018-06-28 PROCEDURE — 99213 OFFICE O/P EST LOW 20 MIN: CPT | Performed by: NURSE PRACTITIONER

## 2018-06-28 RX ORDER — FLUCONAZOLE 150 MG/1
150 TABLET ORAL ONCE
Qty: 1 TABLET | Refills: 1 | Status: SHIPPED | OUTPATIENT
Start: 2018-06-28 | End: 2018-06-28

## 2018-06-28 NOTE — PROGRESS NOTES
Assessment/Plan:    Return to office in 1 week for UPT and if negative interested  In  starting NuvaRing     Diagnoses and all orders for this visit:    Vaginal discharge  -     Chlamydia/GC amplified DNA by PCR  -     POCT wet mount    Yeast vaginitis  -     POCT wet mount  -     fluconazole (DIFLUCAN) 150 mg tablet; Take 1 tablet (150 mg total) by mouth once for 1 dose          Subjective:      Patient ID: Lester Matamoros is a 27 y o  female  HPI  26 yo  Has a new partner, and condom fell off, had unprotected sex  LMP 2018  Has an itchy green discharge, no odor, no pelvic pain, no fever or chills  Took plan B  Wants culture for GC/CT  Would like to start [address]  Is due for menses next week  Has had BV in the past    The following portions of the patient's history were reviewed and updated as appropriate: allergies, current medications, past family history, past medical history, past social history, past surgical history and problem list     Review of Systems   Respiratory: Negative  Cardiovascular: Negative  Genitourinary: Positive for vaginal discharge  Negative for difficulty urinating, menstrual problem, vaginal bleeding and vaginal pain  Objective:      /81 (BP Location: Left arm, Patient Position: Sitting, Cuff Size: Adult)   Pulse 85   Ht 5' 6" (1 676 m)   Wt 91 9 kg (202 lb 9 6 oz)   LMP 2018 (Exact Date)   Breastfeeding? No   BMI 32 70 kg/m²          Physical Exam   Constitutional: She appears well-nourished  Cardiovascular: Normal rate and regular rhythm      Pulmonary/Chest: Effort normal and breath sounds normal    Abdominal: Soft      external genitalia-no lesions  Vagina-small amount white discharge  Cervix-no lesions negative CMT  Uterus-anteverted, normal size, nontender  Adnexa-no masses nontender    Wet mount/KOH positive for yeast negative for Trichomonas negative for BV

## 2018-06-28 NOTE — PATIENT INSTRUCTIONS
Vulvovaginal Candidiasis   AMBULATORY CARE:   Vulvovaginal candidiasis,  or yeast infection, is a common vaginal infection  Vulvovaginal candidiasis is caused by a fungus, or yeast-like germ  Fungi are normally found in your vagina  When there are too many fungi, it can cause an infection  Seek care immediately if:   · You have fever and chills  · You are bleeding from your vagina and it is not your monthly period  · You develop abdominal or pelvic pain  Contact your healthcare provider if:   · Your signs and symptoms get worse, even after treatment  · You have questions or concerns about your condition or care  Signs and symptoms:   · Thick, white, cheese-like discharge from your vagina    · Itching, swelling, or redness in your vagina    · Burning when you urinate  Treatment for vulvovaginal candidiasis  includes medicines to treat the fungal infection and decrease inflammation  The medicine may be a pill, topical cream, or vaginal suppository  Manage your symptoms:   · Wear cotton underwear  · Keep the vaginal area clean and dry  · Do not have sex until your symptoms are gone  · Do not douche  · Do not use feminine hygiene sprays, powders, or bubble bath  Prevent another infection:   · Take showers instead of baths  · Eat yogurt  · Limit the amount of alcohol you drink  · Control your blood sugar if you are diabetic  · Limit your time in hot tubs  Follow up with your healthcare provider as directed:  Write down your questions so you remember to ask them during your visits  © 2017 [employer] Information is for End User's use only and may not be sold, redistributed or otherwise used for commercial purposes  All illustrations and images included in CareNotes® are the copyrighted property of [employer]  or [employer]  The above information is an  only   It is not intended as medical advice for individual conditions or treatments  Talk to your doctor, nurse or pharmacist before following any medical regimen to see if it is safe and effective for you

## 2018-06-29 LAB
CHLAMYDIA DNA CVX QL NAA+PROBE: NORMAL
N GONORRHOEA DNA GENITAL QL NAA+PROBE: NORMAL

## 2018-07-05 ENCOUNTER — OFFICE VISIT (OUTPATIENT)
Dept: OBGYN CLINIC | Facility: CLINIC | Age: 33
End: 2018-07-05
Payer: COMMERCIAL

## 2018-07-05 VITALS
SYSTOLIC BLOOD PRESSURE: 126 MMHG | HEART RATE: 62 BPM | HEIGHT: 66 IN | DIASTOLIC BLOOD PRESSURE: 85 MMHG | BODY MASS INDEX: 32.4 KG/M2 | WEIGHT: 201.6 LBS

## 2018-07-05 DIAGNOSIS — Z72.51 UNPROTECTED SEXUAL INTERCOURSE: ICD-10-CM

## 2018-07-05 DIAGNOSIS — Z30.018 ENCOUNTER FOR PRESCRIPTION FOR NUVARING: Primary | ICD-10-CM

## 2018-07-05 LAB — SL AMB POCT URINE HCG: NEGATIVE

## 2018-07-05 PROCEDURE — 99213 OFFICE O/P EST LOW 20 MIN: CPT | Performed by: OBSTETRICS & GYNECOLOGY

## 2018-07-05 PROCEDURE — 81025 URINE PREGNANCY TEST: CPT | Performed by: OBSTETRICS & GYNECOLOGY

## 2018-07-05 RX ORDER — ETONOGESTREL AND ETHINYL ESTRADIOL 11.7; 2.7 MG/1; MG/1
INSERT, EXTENDED RELEASE VAGINAL
Qty: 3 EACH | Refills: 4 | Status: SHIPPED | OUTPATIENT
Start: 2018-07-05 | End: 2019-04-01 | Stop reason: ALTCHOICE

## 2018-07-05 NOTE — PROGRESS NOTES
26 yo here for UPT and be interested in [address] for her contraception  She did use Nuvaring before without any complications, would like to have it again, after her one episode of unprotected intercourse  She has her period today  MP every 1 month, moderate flow, mild dysmenorrhea relieved by Advil, usually lasts 4-5 days, no pareunia  Pt stated she used condom all the time except for the previous unprotected intercourse  - UPT negative at the office today  - Prescribing Nuvaring for 12 months  Safe sex technique and STD prevention instructions given    - F/u PRN if she has any complaints, otherwise, in 1 year for annual GYN exam

## 2018-07-05 NOTE — PATIENT INSTRUCTIONS
Sexually Transmitted Diseases   AMBULATORY CARE:   A sexually transmitted disease (STD)  A sexually transmitted disease (STD), is also called a sexually transmitted infection (STI)  An STD is an infection caused by bacteria or a virus  STDs are spread by oral, genital, or anal sex  Some examples of STDs are HIV, chlamydia, syphilis, and gonorrhea  Common signs and symptoms include the following: You may have one or more of the following depending on the STD you have:  · Blisters, warts, sores, or a rash on your skin that may be painful    · Discharge from the penis, vagina, or anus that may have a foul smell    · Fever, muscle pain, or swollen lymph nodes in the groin    · Inflammation and itching of the skin    · Pelvic or abdominal pain, or pain during sex or when urinating    · Sore throat, mouth ulcers, or trouble swallowing    · Vaginal bleeding or spotting after sex  Seek care immediately if:   · You have genital swelling or pain, or unusual bleeding  · You have joint pain, rash, swollen lymph nodes, or night sweats  · You have severe abdominal pain  Contact your healthcare provider if:   · You have a fever  · Your symptoms do not go away or they get worse, even after treatment  · You have bleeding or pain during sex  · You have questions or concerns about your condition or care  Treatment for STDs  may include medicines to treat your infection  Prevent the spread of an STD:  Ask your healthcare provider for more information about the following safe sex practices:  · Use condoms  Use a latex condom if you have oral, genital, or anal sex  Use a new condom each time  Use a polyurethane condom if you are allergic to latex  · Do not douche  Douching upsets the normal balance of bacteria are found in your vagina  It does not prevent or clear up vaginal infections  · Do not have sex with someone who has an STD  This includes oral and anal sex  · Limit sexual partners    Have sex with one person who is not having sex with anyone else  · Do not have sex during treatment  Do not have sex while you or your partners are being treated for an STD  · Get screening tests regularly  if you are sexually active  · Get vaccinated  Vaccines may help to prevent your risk of some STDs  Ask your healthcare provider for more information about vaccines for STDs  Follow up with your healthcare provider as directed:  Write down your questions so you remember to ask them during your visits  © 2017 [address] Information is for End User's use only and may not be sold, redistributed or otherwise used for commercial purposes  All illustrations and images included in CareNotes® are the copyrighted property of [employer]  or [employer]equiel Finley  The above information is an  only  It is not intended as medical advice for individual conditions or treatments  Talk to your doctor, nurse or pharmacist before following any medical regimen to see if it is safe and effective for you

## 2018-11-05 ENCOUNTER — OFFICE VISIT (OUTPATIENT)
Dept: OBGYN CLINIC | Facility: CLINIC | Age: 33
End: 2018-11-05
Payer: COMMERCIAL

## 2018-11-05 VITALS
HEART RATE: 85 BPM | BODY MASS INDEX: 33.37 KG/M2 | DIASTOLIC BLOOD PRESSURE: 85 MMHG | SYSTOLIC BLOOD PRESSURE: 127 MMHG | WEIGHT: 207.6 LBS | HEIGHT: 66 IN

## 2018-11-05 DIAGNOSIS — N39.0 URINARY TRACT INFECTION WITH HEMATURIA, SITE UNSPECIFIED: Primary | ICD-10-CM

## 2018-11-05 DIAGNOSIS — R31.9 URINARY TRACT INFECTION WITH HEMATURIA, SITE UNSPECIFIED: Primary | ICD-10-CM

## 2018-11-05 DIAGNOSIS — N93.9 VAGINAL BLEEDING, ABNORMAL: ICD-10-CM

## 2018-11-05 LAB
SL AMB  POCT GLUCOSE, UA: NEGATIVE
SL AMB LEUKOCYTE ESTERASE,UA: NEGATIVE
SL AMB POCT BILIRUBIN,UA: ABNORMAL
SL AMB POCT BLOOD,UA: ABNORMAL
SL AMB POCT CLARITY,UA: ABNORMAL
SL AMB POCT COLOR,UA: ABNORMAL
SL AMB POCT KETONES,UA: NEGATIVE
SL AMB POCT NITRITE,UA: ABNORMAL
SL AMB POCT PH,UA: 6.5
SL AMB POCT SPECIFIC GRAVITY,UA: 1.02
SL AMB POCT URINE HCG: NEGATIVE
SL AMB POCT URINE PROTEIN: NEGATIVE
SL AMB POCT UROBILINOGEN: 0.2

## 2018-11-05 PROCEDURE — 81002 URINALYSIS NONAUTO W/O SCOPE: CPT | Performed by: NURSE PRACTITIONER

## 2018-11-05 PROCEDURE — 81025 URINE PREGNANCY TEST: CPT | Performed by: NURSE PRACTITIONER

## 2018-11-05 PROCEDURE — 87186 SC STD MICRODIL/AGAR DIL: CPT | Performed by: NURSE PRACTITIONER

## 2018-11-05 PROCEDURE — 99213 OFFICE O/P EST LOW 20 MIN: CPT | Performed by: NURSE PRACTITIONER

## 2018-11-05 PROCEDURE — 87077 CULTURE AEROBIC IDENTIFY: CPT | Performed by: NURSE PRACTITIONER

## 2018-11-05 PROCEDURE — 87086 URINE CULTURE/COLONY COUNT: CPT | Performed by: NURSE PRACTITIONER

## 2018-11-05 RX ORDER — SULFAMETHOXAZOLE AND TRIMETHOPRIM 800; 160 MG/1; MG/1
1 TABLET ORAL EVERY 12 HOURS SCHEDULED
Qty: 6 TABLET | Refills: 0 | Status: SHIPPED | OUTPATIENT
Start: 2018-11-05 | End: 2018-11-08

## 2018-11-05 NOTE — PROGRESS NOTES
Assessment/Plan:         Diagnoses and all orders for this visit:    Urinary tract infection with hematuria, site unspecified  -     POCT urine dip  -     Urine culture  -     sulfamethoxazole-trimethoprim (BACTRIM DS) 800-160 mg per tablet; Take 1 tablet by mouth every 12 (twelve) hours for 3 days      call or return to office if symptoms not improved  Increase water intake  Annual due 2019    Subjective:      Patient ID: Edmar Pate is a 26 y o  female  HPI 67-year-old  20 here with reports of urinary symptoms  Has an odor in urine, denies any pelvic pain  She denies any fever or chills  She denies any back pain  Her urine is positive for nitrites today  She voids after sexual relations  LMP was 10/23/2018 x 7 days  Menses then restarted on 2018 with quarter sized clots and now has some spotting  This is the 1st time it has happened    Patient also is on the NuvaRing for contraception, reviewed use with pt and she is using correctly  The following portions of the patient's history were reviewed and updated as appropriate: allergies, current medications, past family history, past medical history, past social history, past surgical history and problem list     Review of Systems   Constitutional: Negative  Respiratory: Negative  Cardiovascular: Negative  Gastrointestinal: Negative  Genitourinary: Positive for menstrual problem  Negative for difficulty urinating, dyspareunia, flank pain, frequency and vaginal discharge  Objective:      /85 (BP Location: Left arm, Patient Position: Sitting, Cuff Size: Adult)   Pulse 85   Ht 5' 6" (1 676 m)   Wt 94 2 kg (207 lb 9 6 oz)   LMP 10/23/2018   Breastfeeding? No   BMI 33 51 kg/m²          Physical Exam   Constitutional: She appears well-nourished  Cardiovascular: Normal rate, regular rhythm and normal heart sounds  Pulmonary/Chest: Effort normal and breath sounds normal    Abdominal: Soft   There is no tenderness  Skin: Skin is warm and dry  Psychiatric: She has a normal mood and affect   Her behavior is normal      Negative CVAT  Urine dip positive for nitrites today  UPT is negative

## 2018-11-05 NOTE — PATIENT INSTRUCTIONS
Dysuria, Ambulatory Care   GENERAL INFORMATION:   Dysuria  is trouble urinating, or pain, burning, or discomfort when you urinate  Dysuria is usually a symptom of another problem, such as a blockage or urinary tract infection  Common symptoms include the following:   · Fever     · Cloudy, bad smelling urine     · Urge to urinate often but urinating little     · Back, side, or abdominal pain     · Blood in your urine     · Discharge that smells bad     · Itching  Seek immediate care for the following symptoms:   · Severe back, side, or abdominal pain    · A fever and shaking chills    · Vomiting several times in a row  Treatment for dysuria  may include medicines to treat a bacterial infection or help decrease bladder spasms  Manage your dysuria:   · Drink liquids as directed  Ask how much liquid to drink each day and which liquids are best for you  You may need to drink more liquid than usual to flush bacteria out of your body  · A sitz bath  may help decrease your pain  Healthcare providers may give you a portable sitz bath  This is a small tub that fits in the toilet  Fill the sitz bath or a bathtub with 4 to 6 inches of warm water  Sit in the warm water for 20 minutes 2 to 3 times a day  Follow up with your healthcare provider as directed:  Write down your questions so you remember to ask them during your visits  CARE AGREEMENT:   You have the right to help plan your care  Learn about your health condition and how it may be treated  Discuss treatment options with your caregivers to decide what care you want to receive  You always have the right to refuse treatment  The above information is an  only  It is not intended as medical advice for individual conditions or treatments  Talk to your doctor, nurse or pharmacist before following any medical regimen to see if it is safe and effective for you    © 2014 [address] is for End User's use only and may not be sold, redistributed or otherwise used for commercial purposes  All illustrations and images included in CareNotes® are the copyrighted property of [employer]  or [employer]iel Finley

## 2018-11-06 ENCOUNTER — HOSPITAL ENCOUNTER (EMERGENCY)
Facility: HOSPITAL | Age: 33
Discharge: HOME | End: 2018-11-06
Attending: EMERGENCY MEDICINE | Admitting: EMERGENCY MEDICINE
Payer: COMMERCIAL

## 2018-11-06 VITALS
BODY MASS INDEX: 33.11 KG/M2 | WEIGHT: 206 LBS | SYSTOLIC BLOOD PRESSURE: 151 MMHG | HEART RATE: 94 BPM | OXYGEN SATURATION: 100 % | RESPIRATION RATE: 20 BRPM | HEIGHT: 66 IN | DIASTOLIC BLOOD PRESSURE: 84 MMHG | TEMPERATURE: 98.9 F

## 2018-11-06 DIAGNOSIS — M54.2 NECK PAIN ON RIGHT SIDE: Primary | ICD-10-CM

## 2018-11-06 PROCEDURE — 96372 THER/PROPH/DIAG INJ SC/IM: CPT

## 2018-11-06 PROCEDURE — 99283 EMERGENCY DEPT VISIT LOW MDM: CPT

## 2018-11-06 RX ORDER — LIDOCAINE 50 MG/G
1 PATCH TOPICAL ONCE
Status: DISCONTINUED | OUTPATIENT
Start: 2018-11-06 | End: 2018-11-06 | Stop reason: HOSPADM

## 2018-11-06 RX ORDER — METHOCARBAMOL 500 MG/1
500 TABLET, FILM COATED ORAL 2 TIMES DAILY
Qty: 10 TABLET | Refills: 0 | Status: SHIPPED | OUTPATIENT
Start: 2018-11-06 | End: 2019-07-02

## 2018-11-06 RX ORDER — KETOROLAC TROMETHAMINE 30 MG/ML
15 INJECTION, SOLUTION INTRAMUSCULAR; INTRAVENOUS ONCE
Status: COMPLETED | OUTPATIENT
Start: 2018-11-06 | End: 2018-11-06

## 2018-11-06 RX ORDER — KETOROLAC TROMETHAMINE 30 MG/ML
15 INJECTION, SOLUTION INTRAMUSCULAR; INTRAVENOUS ONCE
Status: DISCONTINUED | OUTPATIENT
Start: 2018-11-06 | End: 2018-11-06

## 2018-11-06 RX ORDER — LIDOCAINE 50 MG/G
1 PATCH TOPICAL DAILY
Qty: 30 PATCH | Refills: 0 | Status: SHIPPED | OUTPATIENT
Start: 2018-11-06 | End: 2019-07-02

## 2018-11-06 RX ORDER — LIDOCAINE 50 MG/G
1 PATCH TOPICAL ONCE
Status: DISCONTINUED | OUTPATIENT
Start: 2018-11-06 | End: 2018-11-06

## 2018-11-06 RX ADMIN — KETOROLAC TROMETHAMINE 15 MG: 30 INJECTION, SOLUTION INTRAMUSCULAR at 17:52

## 2018-11-06 RX ADMIN — LIDOCAINE 1 PATCH: 50 PATCH CUTANEOUS at 17:53

## 2018-11-06 NOTE — DISCHARGE INSTRUCTIONS
Acute Neck Pain   WHAT YOU NEED TO KNOW:   Acute neck pain starts suddenly, increases quickly, and goes away in a few days  The pain may come and go, or be worse with certain movements  The pain may be only in your neck, or it may move to your arms, back, or shoulders  You may also have pain that starts in another body area and moves to your neck  DISCHARGE INSTRUCTIONS:   Return to the emergency department if:   · You have an injury that causes neck pain and shooting pain down your arms or legs  · Your neck pain suddenly becomes severe  · You have neck pain along with numbness, tingling, or weakness in your arms or legs  · You have a stiff neck, a headache, and a fever  Contact your healthcare provider if:   · You have new or worsening symptoms  · Your symptoms continue even after treatment  · You have questions or concerns about your condition or care  Medicines:   · NSAIDs , such as ibuprofen, help decrease swelling, pain, and fever  This medicine is available without a doctor's order  Ask your healthcare provider which medicine to take and how often to take it  Follow directions  NSAIDs can cause stomach bleeding or kidney problems if not taken correctly  If you take blood thinner medicine, always ask if NSAIDs are safe for you  · Acetaminophen  helps decrease pain and fever  Ask your healthcare provider how much to take and how often to take it  Follow directions  Acetaminophen can cause liver damage if not taken correctly  · Steroid medicine  may be used to reduce inflammation  This can help relieve pain caused by swelling  · Take your medicine as directed  Contact your healthcare provider if you think your medicine is not helping or if you have side effects  Tell him or her if you are allergic to any medicine  Keep a list of the medicines, vitamins, and herbs you take  Include the amounts, and when and why you take them  Bring the list or the pill bottles to follow-up visits  Carry your medicine list with you in case of an emergency  Manage or prevent acute neck pain:   · Rest your neck as directed  Do not make sudden movements, such as turning your head quickly  Your healthcare provider may recommend you wear a cervical collar for a short time  The collar will prevent you from moving your head  This will give your neck time to heal if an injury is causing your neck pain  Ask your healthcare provider when you can return to sports or other normal daily activities  · Apply heat as directed  Heat helps relieve pain and swelling  Use a heat wrap, or soak a small towel in warm water  Wring out the extra water  Apply the heat wrap or towel for 20 minutes every hour, or as directed  · Apply ice as directed  Ice helps relieve pain and swelling, and can help prevent tissue damage  Use an ice pack, or put ice in a bag  Cover the ice pack or back with a towel before you apply it to your neck  Apply the ice pack or ice for 15 minutes every hour, or as directed  Your healthcare provider can tell you how often to apply ice  · Do neck exercises as directed  Neck exercises help strengthen the muscles and increase range of motion  Your healthcare provider will tell you which exercises are right for you  He may give you instructions, or he may recommend that you work with a physical therapist  Your healthcare provider or therapist can make sure you are doing the exercises correctly  · Maintain good posture  Try to keep your head and shoulders lifted when you sit  If you work in front of a computer, make sure the monitor is at the right level  You should not need to look up down to see the screen  You should also not have to lean forward to be able to read what is on the screen  Make sure your keyboard, mouse, and other computer items are placed where you do not have to extend your shoulder to reach them  Get up often if you work in front of a computer or sit for long periods of time  Stretch or walk around to keep your neck muscles loose  Follow up with your healthcare provider as directed: Your healthcare provider may refer you to a specialist if your pain does not get better with treatment  Write down your questions so you remember to ask them during your visits  © 2017 [address] Information is for End User's use only and may not be sold, redistributed or otherwise used for commercial purposes  All illustrations and images included in CareNotes® are the copyrighted property of [employer]  or [employer]iel Finley  The above information is an  only  It is not intended as medical advice for individual conditions or treatments  Talk to your doctor, nurse or pharmacist before following any medical regimen to see if it is safe and effective for you  Methocarbamol (By mouth)   Methocarbamol (meth-oh-ELIDA-ba-mol)  Treats muscle pain and spasms  Brand Name(s): Robaxin, Robaxin-750   There may be other brand names for this medicine  When This Medicine Should Not Be Used: You should not use this medicine if you have had an allergic reaction to methocarbamol or to related medicine such as Norgesic® or Equanil®  How to Use This Medicine:   Tablet  · Your doctor will tell you how much to take and how often  · You should not use a larger dose or take more often than your doctor ordered  If a dose is missed:   · Take the missed dose as soon as you remember if it is in within one hour of your dose time  If it is more than one hour later, skip the missed dose and return to your regular schedule  · You should not use two doses at the same time  How to Store and Dispose of This Medicine:   · Store methocarbamol at room temperature away from excess heat, moisture, and light  · Keep all medicine out of the reach of children    Drugs and Foods to Avoid:   Ask your doctor or pharmacist before using any other medicine, including over-the-counter medicines, vitamins, and herbal products  · Do not drink alcohol while taking this medicine  · Make sure your doctor knows if you are taking any medicine that can make you sleepy such as sleeping pills, sedatives, antidepressants, cold and allergy medicines, or pain  Warnings While Using This Medicine:   · If you are pregnant or breastfeeding, talk to your doctor before taking this medicine  · Methocarbamol makes some people dizzy or drowsy  Be careful if driving a car or using machinery  · This medicine can make your urine turn brown, black, or green  This is not a cause for concern  Possible Side Effects While Using This Medicine:   Call your doctor right away if you notice any of these side effects:  · Trouble breathing  · Skin rash, hives, or itching  · Slurred speech or severe drowsiness  · Fever  If you notice these less serious side effects, talk with your doctor:   · Headache  · Drowsiness or dizziness  · Nausea  · Loss of appetite or metallic taste  · Nasal congestion  If you notice other side effects that you think are caused by this medicine, tell your doctor  Call your doctor for medical advice about side effects  You may report side effects to FDA at 0-065-FDA-6691  © 2017 [address] Information is for End User's use only and may not be sold, redistributed or otherwise used for commercial purposes  The above information is an  only  It is not intended as medical advice for individual conditions or treatments  Talk to your doctor, nurse or pharmacist before following any medical regimen to see if it is safe and effective for you  Lidocaine Patch (On the skin)   Lidocaine (VQL-ixt-fisz)  Treats nerve pain that is caused by herpes zoster or shingles  Brand Name(s): DermacinRx PHN Christopher, DermacinRx ZRM Christopher, Dermazyl, Lidocaine Novaplus, Lidocare, Hennessey, Xryliderm   There may be other brand names for this medicine  When This Medicine Should Not Be Used:    This medicine is not right for everyone  Do not use it if you had an allergic reaction to lidocaine or similar medicines  How to Use This Medicine:   Patch  · Your doctor will tell you how many patches to use, where to apply them, and how often to apply them  Do not use more patches or apply them more often than your doctor tells you to  · This medicine should be used only on the skin  Do not get it in your eyes, nose, or mouth  If it does get on these areas, rinse it off with water or saline right away  · Keep the patch in its envelope until you are ready to put it on  You may cut the patch into a smaller size with scissors before you take off the patch liner  · Wash your hands with soap and water before and after applying a patch  · Apply the patch to clean, dry skin  Do not put the patch over burns, cuts, or irritated skin  · The patch will not stick if it gets wet  Do not wear it when you take a bath or shower, or when you go swimming  · Do not wear the patch for longer than 12 hours in any 24-hour period  · Store the patches at room temperature in a closed container, away from heat, moisture, and direct light  · Fold the used patch in half with the sticky sides together  Throw any used patch away so that children or pets cannot get to it  You will also need to throw away old patches after the expiration date has passed  Drugs and Foods to Avoid:   Ask your doctor or pharmacist before using any other medicine, including over-the-counter medicines, vitamins, and herbal products  · Some foods and medicines can affect how lidocaine works  Tell your doctor if you are using the followin [facility] for heart rhythm problems, such as mexiletine  ¨ Other topical medicine  Warnings While Using This Medicine:   · Tell your doctor if you are pregnant or breastfeeding, or if you have liver disease  Tell your doctor if you are allergic to any other medicine    · Do not use a heating pad, electric blanket, or other heat source over the patch     · Keep all medicine out of the reach of children  Never share your medicine with anyone  Possible Side Effects While Using This Medicine:   Call your doctor right away if you notice any of these side effects:  · Allergic reaction: Itching or hives, swelling in your face or hands, swelling or tingling in your mouth or throat, chest tightness, trouble breathing  · Redness, itching, burning, swelling, or blisters where the patch is applied  If you notice other side effects that you think are caused by this medicine, tell your doctor  Call your doctor for medical advice about side effects  You may report side effects to FDA at 5-285-FDA-1088  © 2017 [address] [employer] Information is for End User's use only and may not be sold, redistributed or otherwise used for commercial purposes  The above information is an  only  It is not intended as medical advice for individual conditions or treatments  Talk to your doctor, nurse or pharmacist before following any medical regimen to see if it is safe and effective for you

## 2018-11-06 NOTE — ED NOTES
PT awake and alert, no distress noted  No other questions upon d/c       April Carrington Dias RN  11/06/18 [id number]

## 2018-11-06 NOTE — ED PROVIDER NOTES
History  Chief Complaint   Patient presents with    Neck Pain     Patient c/o spasms in her right side of neck after she woke up this morning  Patient states"she is unsure if she slept wrong" No fall or injury to neck  Patient is a 18-year-old female who presents emergency department with achy nonradiating right-sided neck pain for six hours  Patient states that she woke up with right-sided neck pain, and denies mechanism of injury  Patient states, "I think I slept on it wrong, if I turn my head to the left my neck hurts "  Patient denies palliative factors  Patient states provocative factors including pressure to right side of neck and neck rotation to left side  Patient denies numbness and tingling, and loss of power  No nausea, vomiting, fever, or chills  No diarrhea, constipation, or urinary symptoms  Patient denies trauma or fall  Patient denies dizziness, visual changes, and tinnitus  No chest pain, shortness of breath, abdominal pain  Patient is not in acute distress          History provided by:  Patient   used: No    Neck Pain   Pain location:  R side  Quality:  Cramping and shooting  Pain radiates to:  Does not radiate  Pain severity:  Mild  Pain is:  Same all the time  Onset quality:  Gradual  Duration:  6 hours  Timing:  Constant  Progression:  Worsening  Chronicity:  New  Context: not fall, not jumping from heights, not lifting a heavy object, not MCA, not MVC, not pedestrian accident and not recent injury    Relieved by:  Nothing  Worsened by:  Twisting and bending  Ineffective treatments:  None tried  Associated symptoms: no bladder incontinence, no bowel incontinence, no chest pain, no fever, no headaches, no numbness, no photophobia and no visual change    Risk factors: no hx of head and neck radiation, no hx of osteoporosis, no hx of spinal trauma and no recurrent falls        Prior to Admission Medications   Prescriptions Last Dose Informant Patient Reported? Taking?   etonogestrel-ethinyl estradiol (NUVARING) 0 12-0 015 MG/24HR vaginal ring   No Yes   Sig: Insert vaginally and leave in place for 3 consecutive weeks, then remove for 1 week  sulfamethoxazole-trimethoprim (BACTRIM DS) 800-160 mg per tablet   No Yes   Sig: Take 1 tablet by mouth every 12 (twelve) hours for 3 days      Facility-Administered Medications: None       Past Medical History:   Diagnosis Date    Urinary tract infection with hematuria 11/5/2018       Past Surgical History:   Procedure Laterality Date    CORRECTION HAMMER TOE Right     TOE SURGERY Right 2005       Family History   Problem Relation Age of Onset    No Known Problems Mother     No Known Problems Father     Diabetes Maternal Grandmother      I have reviewed and agree with the history as documented  Social History   Substance Use Topics    Smoking status: Former Smoker     Years: 2 00     Types: Cigars     Quit date: 3/15/2005    Smokeless tobacco: Former User      Comment: black and milds, occassionally    Alcohol use 1 2 oz/week     2 Glasses of wine per week      Comment: monthly        Review of Systems   Constitutional: Negative for activity change, appetite change, chills, fatigue and fever  HENT: Negative for congestion, ear pain and sinus pressure  Eyes: Negative for photophobia and visual disturbance  Respiratory: Negative for cough, chest tightness and shortness of breath  Cardiovascular: Negative for chest pain and palpitations  Gastrointestinal: Negative for abdominal pain, bowel incontinence, constipation, diarrhea, nausea and vomiting  Genitourinary: Negative for bladder incontinence, difficulty urinating, dysuria and frequency  Musculoskeletal: Positive for neck pain  Negative for arthralgias, back pain, gait problem and neck stiffness  Skin: Negative for pallor and rash  Allergic/Immunologic: Negative for environmental allergies and food allergies     Neurological: Negative for dizziness, numbness and headaches  Psychiatric/Behavioral: Negative for confusion  All other systems reviewed and are negative  Physical Exam  Physical Exam   Constitutional: She is oriented to person, place, and time  She appears well-developed and well-nourished  She is active and cooperative  Non-toxic appearance  She does not have a sickly appearance  She does not appear ill  No distress  HENT:   Head: Normocephalic and atraumatic  Head is without abrasion, without contusion and without laceration  Right Ear: Hearing, tympanic membrane, external ear and ear canal normal  No drainage, swelling or tenderness  No mastoid tenderness  No decreased hearing is noted  Left Ear: Hearing, tympanic membrane, external ear and ear canal normal  No drainage, swelling or tenderness  No mastoid tenderness  No decreased hearing is noted  Nose: Nose normal  Right sinus exhibits no maxillary sinus tenderness and no frontal sinus tenderness  Left sinus exhibits no maxillary sinus tenderness and no frontal sinus tenderness  Mouth/Throat: Uvula is midline, oropharynx is clear and moist and mucous membranes are normal  No oropharyngeal exudate, posterior oropharyngeal edema or posterior oropharyngeal erythema  No tonsillar exudate  "!" on graphical documentation pictograph indicates tenderness  Cervical passive range of motion intact   Right cervical tenderness   Pain with cervical extension and cervical rotation in eft direction   Eyes: Pupils are equal, round, and reactive to light  Conjunctivae, EOM and lids are normal  Right eye exhibits no discharge  Left eye exhibits no discharge  Neck: Trachea normal, normal range of motion, full passive range of motion without pain and phonation normal  Neck supple  No JVD present  Muscular tenderness present  No tracheal tenderness and no spinous process tenderness present  No neck rigidity   No tracheal deviation, no edema, no erythema and normal range of motion present  Cervical passive range of motion intact   Right cervical tenderness   Pain with cervical extension and rotation in left direction    Neurovascularly intact  No grinding or clicking of joints   Cardiovascular: Normal rate, regular rhythm, normal heart sounds, intact distal pulses and normal pulses  Pulses:       Carotid pulses are 2+ on the right side, and 2+ on the left side  Radial pulses are 2+ on the right side, and 2+ on the left side  Dorsalis pedis pulses are 2+ on the right side, and 2+ on the left side  Posterior tibial pulses are 2+ on the right side, and 2+ on the left side  Pulmonary/Chest: Effort normal and breath sounds normal  No stridor  She has no decreased breath sounds  She has no wheezes  She has no rhonchi  She has no rales  She exhibits no tenderness  Abdominal: Soft  Bowel sounds are normal  She exhibits no distension  There is no tenderness  There is no rigidity, no rebound and no guarding  Musculoskeletal: Normal range of motion  Passive ROM intact  Upper and lower extremity 5/5 bilaterally  Neurovascularly intact  No grinding or clicking of joints     Lymphadenopathy:        Head (right side): No submental, no submandibular, no tonsillar, no preauricular, no posterior auricular and no occipital adenopathy present  Head (left side): No submental, no submandibular, no tonsillar, no preauricular, no posterior auricular and no occipital adenopathy present  She has no cervical adenopathy  Right cervical: No superficial cervical, no deep cervical and no posterior cervical adenopathy present  Left cervical: No superficial cervical, no deep cervical and no posterior cervical adenopathy present  Neurological: She is alert and oriented to person, place, and time  She has normal strength and normal reflexes  No cranial nerve deficit or sensory deficit  Coordination normal  GCS eye subscore is 4  GCS verbal subscore is 5   GCS motor subscore is 6  Reflex Scores:       Patellar reflexes are 2+ on the right side and 2+ on the left side  Skin: Skin is warm and intact  Capillary refill takes less than 2 seconds  She is not diaphoretic  Psychiatric: She has a normal mood and affect  Her speech is normal and behavior is normal  Judgment and thought content normal  Cognition and memory are normal    Vitals reviewed        Vital Signs  ED Triage Vitals   Temperature Pulse Respirations Blood Pressure SpO2   11/06/18 1723 11/06/18 1723 11/06/18 1723 11/06/18 1723 11/06/18 1723   98 9 °F (37 2 °C) 94 20 151/84 100 %      Temp Source Heart Rate Source Patient Position - Orthostatic VS BP Location FiO2 (%)   11/06/18 1723 11/06/18 1723 11/06/18 1723 11/06/18 1723 --   Oral Monitor Sitting Left arm       Pain Score       11/06/18 1752       Worst Possible Pain           Vitals:    11/06/18 1723   BP: 151/84   Pulse: 94   Patient Position - Orthostatic VS: Sitting       Visual Acuity      ED Medications  Medications   ketorolac (TORADOL) injection 15 mg (15 mg Intramuscular Given 11/6/18 1752)       Diagnostic Studies  Results Reviewed     None                 No orders to display              Procedures  Procedures       Phone Contacts  ED Phone Contact    ED Course                               MDM  Number of Diagnoses or Management Options  Neck pain on right side: minor  Diagnosis management comments: Differential diagnosis covers but is not limited to:  Cervical radiculopathy  Cervical muscle spasm  Meningitis  Cervical vertebra subluxation  Carotid insufficiency  Rotator cuff injury    Delivered Toradol and Lidoderm patch in ED; patient verbally affirmed improvement of presenting neck pain symptoms, cervical rotation and extension with less pain on PROM  Prescribed Robaxin and Lidoderm and discussed medication administration and side effects  Follow-up with [facility]  Follow-up PCP  Follow up with emergency department if symptoms persist or exacerbate    Risk of Complications, Morbidity, and/or Mortality  Presenting problems: minimal    Patient Progress  Patient progress: stable    CritCare Time    Disposition  Final diagnoses:   Neck pain on right side     Time reflects when diagnosis was documented in both MDM as applicable and the Disposition within this note     Time User Action Codes Description Comment    11/6/2018  6:43 PM Jamilah Almendarez Add [M54 2] Neck pain on right side       ED Disposition     ED Disposition Condition Comment    Discharge  Benjamin Pen discharge to home/self care      Condition at discharge: Stable        Follow-up Information     Follow up With Specialties Details Why Contact Info Additional Information    [facility] Physical Therapy Call in 2 days  [address] [facility] [address] [phone] [facility], [place], [place], [address] [facility] [address] Family Medicine Call in 2 days for further evaluation of symptoms [id number]  [facility] [address] [facility] [address], [address] [place], [place], [address] [place] [address] Emergency Department Emergency Medicine Go to As needed [address]  [phone] [facility], [address], [place], [place], [address]          Discharge Medication List as of 11/6/2018  6:47 PM      START taking these medications    Details   lidocaine (LIDODERM) 5 % Apply 1 patch topically daily Remove & Discard patch within 12 hours or as directed by MD, Starting Tue 11/6/2018, Normal      methocarbamol (ROBAXIN) 500 mg tablet Take 1 tablet (500 mg total) by mouth 2 (two) times a day for 5 days, Starting Tue 11/6/2018, Until Sun 11/11/2018, Print         CONTINUE these medications which have NOT CHANGED    Details   etonogestrel-ethinyl estradiol (NUVARING) 0 12-0 015 MG/24HR vaginal ring Insert vaginally and leave in place for 3 consecutive weeks, then remove for 1 week , Normal      sulfamethoxazole-trimethoprim (BACTRIM DS) 800-160 mg per tablet Take 1 tablet by mouth every 12 (twelve) hours for 3 days, Starting Mon 11/5/2018, Until Thu 11/8/2018, Normal           No discharge procedures on file      ED Provider  Electronically Signed by           Petra Kenney PA-C  11/07/18 [id number] [address]  11/07/18 9394

## 2018-11-07 LAB — BACTERIA UR CULT: ABNORMAL

## 2019-03-21 ENCOUNTER — OFFICE VISIT (OUTPATIENT)
Dept: OBGYN CLINIC | Facility: CLINIC | Age: 34
End: 2019-03-21

## 2019-03-21 VITALS
WEIGHT: 206 LBS | HEART RATE: 82 BPM | DIASTOLIC BLOOD PRESSURE: 82 MMHG | HEIGHT: 66 IN | SYSTOLIC BLOOD PRESSURE: 126 MMHG | BODY MASS INDEX: 33.11 KG/M2

## 2019-03-21 DIAGNOSIS — R39.9 UTI SYMPTOMS: Primary | ICD-10-CM

## 2019-03-21 PROBLEM — Z01.419 ENCOUNTER FOR GYNECOLOGICAL EXAMINATION WITHOUT ABNORMAL FINDING: Status: RESOLVED | Noted: 2018-03-26 | Resolved: 2019-03-21

## 2019-03-21 PROBLEM — Z30.018 ENCOUNTER FOR PRESCRIPTION FOR NUVARING: Status: RESOLVED | Noted: 2018-07-05 | Resolved: 2019-03-21

## 2019-03-21 LAB
SL AMB  POCT GLUCOSE, UA: NEGATIVE
SL AMB LEUKOCYTE ESTERASE,UA: ABNORMAL
SL AMB POCT BILIRUBIN,UA: NEGATIVE
SL AMB POCT BLOOD,UA: ABNORMAL
SL AMB POCT CLARITY,UA: ABNORMAL
SL AMB POCT COLOR,UA: YELLOW
SL AMB POCT KETONES,UA: NEGATIVE
SL AMB POCT NITRITE,UA: NEGATIVE
SL AMB POCT PH,UA: 7
SL AMB POCT SPECIFIC GRAVITY,UA: 1.02
SL AMB POCT URINE PROTEIN: NEGATIVE
SL AMB POCT UROBILINOGEN: 0.2

## 2019-03-21 PROCEDURE — 81002 URINALYSIS NONAUTO W/O SCOPE: CPT | Performed by: NURSE PRACTITIONER

## 2019-03-21 PROCEDURE — 87086 URINE CULTURE/COLONY COUNT: CPT | Performed by: NURSE PRACTITIONER

## 2019-03-21 PROCEDURE — 99213 OFFICE O/P EST LOW 20 MIN: CPT | Performed by: NURSE PRACTITIONER

## 2019-03-21 RX ORDER — SULFAMETHOXAZOLE AND TRIMETHOPRIM 800; 160 MG/1; MG/1
1 TABLET ORAL EVERY 12 HOURS SCHEDULED
Qty: 6 TABLET | Refills: 0 | Status: SHIPPED | OUTPATIENT
Start: 2019-03-21 | End: 2019-03-24

## 2019-03-21 NOTE — PROGRESS NOTES
Assessment/Plan:       Diagnoses and all orders for this visit:    UTI symptoms  -     POCT urine dip  -     Urine culture  -     sulfamethoxazole-trimethoprim (BACTRIM DS) 800-160 mg per tablet; Take 1 tablet by mouth every 12 (twelve) hours for 3 days      gave  the patient the option to await urine culture or treat with antibiotic, patient prefers to treat since upcoming weekend and office is closed  patient to call if symptoms do not improve  patient to return to office for her annual gyn exam  Subjective:      Patient ID: Shy Oakes is a 26 y o  female  HPI 57-year-old  022, patient with reports of odor in her urine  She has had symptoms for 2 days  She denies any pelvic pain ,she denies fever, chills ,flank or back pain  Her urine is trace for blood and  trace for leukocytes, negative for nitrates  Patient was seen in 2018 with same symptoms and E coli >100,000 was present in her urine culture  Patient recently started using the diva cup and noticed the symptoms after she stopped using  Her LMP was 2019 she uses condoms for contraception  She is due for her annual gyn exam    Urine dip today is trace blood and trace leukocytes negative nitrates      The following portions of the patient's history were reviewed and updated as appropriate: allergies, current medications, past family history, past medical history, past social history, past surgical history and problem list     Review of Systems   Constitutional: Negative  Respiratory: Negative  Cardiovascular: Negative  Genitourinary: Negative for dysuria, flank pain, frequency, hematuria, pelvic pain, urgency, vaginal bleeding and vaginal discharge  Neurological: Negative  Psychiatric/Behavioral: Negative            Objective:      /82 (BP Location: Left arm, Patient Position: Sitting, Cuff Size: Adult)   Pulse 82   Ht 5' 6" (1 676 m)   Wt 93 4 kg (206 lb)   LMP 2019   BMI 33 25 kg/m² Physical Exam   Constitutional: She appears well-nourished  Cardiovascular: Normal rate, regular rhythm and normal heart sounds  Pulmonary/Chest: Breath sounds normal    Abdominal: Soft  There is no tenderness  Psychiatric: She has a normal mood and affect   Her behavior is normal

## 2019-03-22 LAB — BACTERIA UR CULT: NORMAL

## 2019-04-01 ENCOUNTER — OFFICE VISIT (OUTPATIENT)
Dept: OBGYN CLINIC | Facility: CLINIC | Age: 34
End: 2019-04-01

## 2019-04-01 VITALS
BODY MASS INDEX: 32.88 KG/M2 | HEIGHT: 66 IN | DIASTOLIC BLOOD PRESSURE: 82 MMHG | HEART RATE: 82 BPM | WEIGHT: 204.6 LBS | SYSTOLIC BLOOD PRESSURE: 135 MMHG

## 2019-04-01 DIAGNOSIS — Z11.3 ROUTINE SCREENING FOR STI (SEXUALLY TRANSMITTED INFECTION): ICD-10-CM

## 2019-04-01 DIAGNOSIS — Z01.419 ENCOUNTER FOR GYNECOLOGICAL EXAMINATION WITHOUT ABNORMAL FINDING: Primary | ICD-10-CM

## 2019-04-01 PROCEDURE — 99395 PREV VISIT EST AGE 18-39: CPT | Performed by: NURSE PRACTITIONER

## 2019-04-01 PROCEDURE — 87491 CHLMYD TRACH DNA AMP PROBE: CPT | Performed by: NURSE PRACTITIONER

## 2019-04-01 PROCEDURE — 87591 N.GONORRHOEAE DNA AMP PROB: CPT | Performed by: NURSE PRACTITIONER

## 2019-04-03 LAB
C TRACH DNA SPEC QL NAA+PROBE: NEGATIVE
N GONORRHOEA DNA SPEC QL NAA+PROBE: NEGATIVE

## 2019-07-02 ENCOUNTER — HOSPITAL ENCOUNTER (EMERGENCY)
Facility: HOSPITAL | Age: 34
Discharge: HOME | End: 2019-07-02
Attending: EMERGENCY MEDICINE
Payer: COMMERCIAL

## 2019-07-02 ENCOUNTER — APPOINTMENT (EMERGENCY)
Dept: ULTRASOUND IMAGING | Facility: HOSPITAL | Age: 34
End: 2019-07-02
Payer: COMMERCIAL

## 2019-07-02 VITALS
HEART RATE: 86 BPM | OXYGEN SATURATION: 100 % | SYSTOLIC BLOOD PRESSURE: 130 MMHG | TEMPERATURE: 98.7 F | DIASTOLIC BLOOD PRESSURE: 78 MMHG | RESPIRATION RATE: 20 BRPM

## 2019-07-02 DIAGNOSIS — O26.899 ABDOMINAL PAIN IN PREGNANCY: ICD-10-CM

## 2019-07-02 DIAGNOSIS — R10.9 ABDOMINAL PAIN IN PREGNANCY: ICD-10-CM

## 2019-07-02 DIAGNOSIS — O21.9 VOMITING DURING PREGNANCY: Primary | ICD-10-CM

## 2019-07-02 LAB
B-HCG SERPL-ACNC: ABNORMAL MIU/ML
BACTERIA UR QL AUTO: ABNORMAL /HPF
BILIRUB UR QL STRIP: NEGATIVE
CLARITY UR: CLEAR
COLOR UR: YELLOW
EXT PREG TEST URINE: POSITIVE
EXT. CONTROL ED NAV: ABNORMAL
GLUCOSE UR STRIP-MCNC: NEGATIVE MG/DL
HGB UR QL STRIP.AUTO: ABNORMAL
KETONES UR STRIP-MCNC: NEGATIVE MG/DL
LEUKOCYTE ESTERASE UR QL STRIP: NEGATIVE
NITRITE UR QL STRIP: NEGATIVE
NON-SQ EPI CELLS URNS QL MICRO: ABNORMAL /HPF
PH UR STRIP.AUTO: 6.5 [PH] (ref 4.5–8)
PROT UR STRIP-MCNC: NEGATIVE MG/DL
RBC #/AREA URNS AUTO: ABNORMAL /HPF
SP GR UR STRIP.AUTO: 1.01 (ref 1–1.03)
UROBILINOGEN UR QL STRIP.AUTO: 0.2 E.U./DL
WBC #/AREA URNS AUTO: ABNORMAL /HPF

## 2019-07-02 PROCEDURE — 81001 URINALYSIS AUTO W/SCOPE: CPT

## 2019-07-02 PROCEDURE — 84702 CHORIONIC GONADOTROPIN TEST: CPT | Performed by: EMERGENCY MEDICINE

## 2019-07-02 PROCEDURE — 99284 EMERGENCY DEPT VISIT MOD MDM: CPT

## 2019-07-02 PROCEDURE — 81025 URINE PREGNANCY TEST: CPT | Performed by: EMERGENCY MEDICINE

## 2019-07-02 PROCEDURE — 76801 OB US < 14 WKS SINGLE FETUS: CPT

## 2019-07-02 PROCEDURE — 36415 COLL VENOUS BLD VENIPUNCTURE: CPT | Performed by: EMERGENCY MEDICINE

## 2019-07-02 PROCEDURE — 99284 EMERGENCY DEPT VISIT MOD MDM: CPT | Performed by: EMERGENCY MEDICINE

## 2019-07-02 RX ORDER — METOCLOPRAMIDE 10 MG/1
10 TABLET ORAL ONCE
Status: COMPLETED | OUTPATIENT
Start: 2019-07-02 | End: 2019-07-02

## 2019-07-02 RX ORDER — METOCLOPRAMIDE 10 MG/1
10 TABLET ORAL EVERY 6 HOURS PRN
Qty: 30 TABLET | Refills: 0 | Status: SHIPPED | OUTPATIENT
Start: 2019-07-02

## 2019-07-02 RX ADMIN — METOCLOPRAMIDE HYDROCHLORIDE 10 MG: 10 TABLET ORAL at 18:49

## 2019-07-02 NOTE — ED PROVIDER NOTES
History  Chief Complaint   Patient presents with    Vomiting     Pt states she is 6 weeks pregnant  (+) Vomiting for one week  Pt not evaluated at her OB for the nausea  History provided by:  Patient   used: No    Vomiting   Associated symptoms: abdominal pain    Associated symptoms: no fever     17-year-old female  about 6 weeks gestation presented for evaluation of vomiting over the last week as well as some moderate, sharp, left lower abdominal pain intermittently  She denies any active pain at the moment  She has not had any vaginal bleeding or discharge or urinary symptoms  Has not seen OB for this pregnancy yet  On exam here she is well appearing overall  Vitals normal   No reproducible abdominal tenderness  Plan UA, pelvic ultrasound to rule out ectopic pregnancy  Will also check serum HCG for monitoring, give Reglan for symptom control  Patient reports that she has Rh positive blood type  Had not needed RhoGAM during prior pregnancies  None       Past Medical History:   Diagnosis Date    Urinary tract infection with hematuria 2018       Past Surgical History:   Procedure Laterality Date    CORRECTION HAMMER TOE Right     TOE SURGERY Right 2005       Family History   Problem Relation Age of Onset    No Known Problems Mother     No Known Problems Father     Diabetes Maternal Grandmother      I have reviewed and agree with the history as documented  Social History     Tobacco Use    Smoking status: Former Smoker     Years: 2 00     Types: Cigars     Last attempt to quit: 3/15/2005     Years since quittin 3    Smokeless tobacco: Former User    Tobacco comment: black and milena, occassionally   Substance Use Topics    Alcohol use:  Yes     Alcohol/week: 2 0 standard drinks     Types: 2 Glasses of wine per week     Comment: monthly    Drug use: No        Review of Systems   Constitutional: Negative for activity change, appetite change, fatigue and fever    Respiratory: Negative for chest tightness and shortness of breath  Gastrointestinal: Positive for abdominal pain, nausea and vomiting  Genitourinary: Negative for difficulty urinating, dysuria and flank pain  Musculoskeletal: Negative for back pain  Skin: Negative for color change  Neurological: Negative for dizziness and weakness  All other systems reviewed and are negative  Physical Exam  Physical Exam   Constitutional: She is oriented to person, place, and time  She appears well-developed and well-nourished  No distress  HENT:   Head: Normocephalic  Mouth/Throat: Oropharynx is clear and moist    Cardiovascular: Normal rate and regular rhythm  Abdominal: Soft  She exhibits no distension  There is no tenderness  Neurological: She is alert and oriented to person, place, and time  Skin: Skin is warm and dry  Psychiatric: She has a normal mood and affect  Her behavior is normal    Nursing note and vitals reviewed        Vital Signs  ED Triage Vitals [07/02/19 1801]   Temperature Pulse Respirations Blood Pressure SpO2   98 7 °F (37 1 °C) 86 20 130/78 100 %      Temp Source Heart Rate Source Patient Position - Orthostatic VS BP Location FiO2 (%)   Oral Monitor Sitting Right arm --      Pain Score       --           Vitals:    07/02/19 1801   BP: 130/78   Pulse: 86   Patient Position - Orthostatic VS: Sitting         Visual Acuity      ED Medications  Medications   metoclopramide (REGLAN) tablet 10 mg (10 mg Oral Given 7/2/19 1849)       Diagnostic Studies  Results Reviewed     Procedure Component Value Units Date/Time    Quantitative hCG [[id number]]  (Abnormal) Collected:  07/02/19 1832    Lab Status:  Final result Specimen:  Blood from Arm, Left Updated:  07/02/19 1938     HCG, Lula Amezquita 663,224 mIU/mL     Narrative:        Expected Ranges:     Approximate               Approximate HCG  Gestation age          Concentration ( mIU/mL)  _____________          ______________________ Weeks                      HCG values  0 2-1                       5-50  1-2                           2-3                         100-5000  3-4                         500-34817  4-5                         1000-30071  5-6                         [id number]  6-8                         55994-266011  8-12                        25088-724804      Urine Microscopic [[id number]]  (Abnormal) Collected:  07/02/19 1848    Lab Status:  Final result Specimen:  Urine, Clean Catch Updated:  07/02/19 1935     RBC, UA 0-1 /hpf      WBC, UA 1-2 /hpf      Epithelial Cells Moderate /hpf      Bacteria, UA Occasional /hpf     POCT pregnancy, urine [[id number]]  (Abnormal) Resulted:  07/02/19 1841    Lab Status:  Final result Updated:  07/02/19 1841     EXT PREG TEST UR (Ref: Negative) positive     Control valid    ED Urine Macroscopic [[id number]]  (Abnormal) Collected:  07/02/19 1848    Lab Status:  Final result Specimen:  Urine Updated:  07/02/19 1840     Color, UA Yellow     Clarity, UA Clear     pH, UA 6 5     Leukocytes, UA Negative     Nitrite, UA Negative     Protein, UA Negative mg/dl      Glucose, UA Negative mg/dl      Ketones, UA Negative mg/dl      Urobilinogen, UA 0 2 E U /dl      Bilirubin, UA Negative     Blood, UA Trace     Specific Protection, UA 1 015    Narrative:       CLINITEK RESULT                 US OB < 14 weeks with transvaginal   Final Result by Maria Del Rosario Lozano MD (07/02 2005)   1  Single live intrauterine gestation at 7 weeks 0 days (range +/- 4)  BRIT of 2/18/2020    2  Eccentric isoechoic structure within the gestational sac measuring 1 3 x 0 7 x 1 5 cm is of uncertain significance, but may represent an early placenta  Recommend attention on future follow-up studies           Workstation performed: [id number]                    Procedures  Procedures       ED Course                               MDM  Number of Diagnoses or Management Options  Abdominal pain in pregnancy: new and requires workup  Vomiting during pregnancy: new and requires workup  Diagnosis management comments: 49-year-old female reporting about 6 weeks gestation presented with left lower pelvic pain over the past few days  She has also had some fairly persistent nausea, sometimes vomiting  No tenderness on exam   Pelvic ultrasound notable for a live IUP at 7 weeks  Rh positive  Given Reglan with some relief of nausea  Stable for discharge home and follow up with her OB  Amount and/or Complexity of Data Reviewed  Clinical lab tests: ordered and reviewed  Tests in the radiology section of CPT®: ordered and reviewed    Patient Progress  Patient progress: improved      Disposition  Final diagnoses:   Vomiting during pregnancy   Abdominal pain in pregnancy     Time reflects when diagnosis was documented in both MDM as applicable and the Disposition within this note     Time User Action Codes Description Comment    7/2/2019  8:09 PM Rin MADSEN Add [O21 9] Vomiting during pregnancy     7/2/2019  8:10 PM Taggart, Marino Kussmaul Add [O26 899,  R10 9] Abdominal pain in pregnancy       ED Disposition     ED Disposition Condition Date/Time Comment    Discharge Stable Tue Jul 2, 2019  8:09 PM Ileana Perdomo discharge to home/self care  Follow-up Information    None         Discharge Medication List as of 7/2/2019  8:11 PM      START taking these medications    Details   metoclopramide (REGLAN) 10 mg tablet Take 1 tablet (10 mg total) by mouth every 6 (six) hours as needed (nausea/vomiting), Starting Tue 7/2/2019, Print           No discharge procedures on file      ED Provider  Electronically Signed by           Ximena Sousa MD  07/03/19 2007

## 2020-03-27 ENCOUNTER — TELEMEDICINE (OUTPATIENT)
Dept: OBGYN CLINIC | Facility: CLINIC | Age: 35
End: 2020-03-27

## 2020-03-27 DIAGNOSIS — B37.9 ANTIBIOTIC-INDUCED YEAST INFECTION: ICD-10-CM

## 2020-03-27 DIAGNOSIS — N89.8 VAGINAL ODOR: ICD-10-CM

## 2020-03-27 DIAGNOSIS — N76.0 BACTERIAL VAGINOSIS: Primary | ICD-10-CM

## 2020-03-27 DIAGNOSIS — B96.89 BACTERIAL VAGINOSIS: Primary | ICD-10-CM

## 2020-03-27 DIAGNOSIS — T36.95XA ANTIBIOTIC-INDUCED YEAST INFECTION: ICD-10-CM

## 2020-03-27 PROCEDURE — 99213 OFFICE O/P EST LOW 20 MIN: CPT | Performed by: NURSE PRACTITIONER

## 2020-03-27 RX ORDER — METRONIDAZOLE 500 MG/1
500 TABLET ORAL EVERY 12 HOURS SCHEDULED
Qty: 14 TABLET | Refills: 0 | Status: SHIPPED | OUTPATIENT
Start: 2020-03-27 | End: 2020-04-03

## 2020-03-27 RX ORDER — FLUCONAZOLE 150 MG/1
150 TABLET ORAL ONCE
Qty: 1 TABLET | Refills: 0 | Status: SHIPPED | OUTPATIENT
Start: 2020-03-27 | End: 2020-03-27

## 2020-03-27 NOTE — PROGRESS NOTES
Virtual Regular Visit    Problem List Items Addressed This Visit        Genitourinary    Bacterial vaginosis - Primary    Relevant Medications    metroNIDAZOLE (FLAGYL) 500 mg tablet       Other    Vaginal odor    Relevant Medications    metroNIDAZOLE (FLAGYL) 500 mg tablet      Other Visit Diagnoses     Antibiotic-induced yeast infection        Relevant Medications    fluconazole (DIFLUCAN) 150 mg tablet        Assessment/Plan         Diagnoses and all orders for this visit:    Bacterial vaginosis  -     metroNIDAZOLE (FLAGYL) 500 mg tablet; Take 1 tablet (500 mg total) by mouth every 12 (twelve) hours for 7 days No ETOH with use   review to call if symptoms not resolved   review to use mild soap like Dove unscented soap for BV being, no douching, try to use her own products well she is on the road traveling  , patient using over-the-counter probiotic  Vaginal odor  -     metroNIDAZOLE (FLAGYL) 500 mg tablet; Take 1 tablet (500 mg total) by mouth every 12 (twelve) hours for 7 days No ETOH with use   fluconazole 150 mg x 1 for patient's request due to antibiotic use and yeast infections    Reason for visit is pt reports vaginal odor and discharge  Encounter provider TORSTEN Parikh    Provider located at [address] [place] [address]  [phone]      Recent Visits  No visits were found meeting these conditions  Showing recent visits within past 7 days and meeting all other requirements     Today's Visits  Date Type Provider Dept   03/27/20 Telemedicine Srinivasa Parikh 116 today's visits and meeting all other requirements     Future Appointments  No visits were found meeting these conditions  Showing future appointments within next 150 days and meeting all other requirements        After connecting through [employer], the patient was identified by name and date of birth   Uma Found was informed that this is a telemedicine visit and that the visit is being conducted through [employer] which may not be secure and therefore, might not be HIPAA-compliant  My office door was closed  No one else was in the room  She acknowledged consent and understanding of privacy and security of the video platform  The patient has agreed to participate and understands they can discontinue the visit at any time  Gigi Greer is a 37 y o  female that has complaints of vaginal odor and discharge  She reports she has a fishy odor  She reports a white discharge when she checks with her finger  She denies any pelvic pain  She reports she is with the same partner, no new sexual partners  She denies any kind of itching  She denies any kind of fever, chills  She denies any urinary symptoms  She reports she has been taking azo just because she has been traveling  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[place]  She reports she has been sitting a lot  Her LMP was 03/2020, reports her menses are regular  Currently not on any birth control  Patient was last seen in the office 04/01/2019 for her annual gyn exam and is due in April  We discussed this and she will make a follow-up appointment probably and May 2020  Patient denies any allergies to medications, or currently on any medications  Is a former smoker      she reports sometimes she will get a yeast infection with antibiotic use, will send fluconazole 150 mg 1 time 1 dose      Past Medical History:   Diagnosis Date    Urinary tract infection with hematuria 11/5/2018       Past Surgical History:   Procedure Laterality Date    CORRECTION HAMMER TOE Right     TOE SURGERY Right 2005       Current Outpatient Medications   Medication Sig Dispense Refill    fluconazole (DIFLUCAN) 150 mg tablet Take 1 tablet (150 mg total) by mouth once for 1 dose 1 tablet 0    metoclopramide (REGLAN) 10 mg tablet Take 1 tablet (10 mg total) by mouth every 6 (six) hours as needed (nausea/vomiting) (Patient not taking: Reported on 3/27/2020) 30 tablet 0    metroNIDAZOLE (FLAGYL) 500 mg tablet Take 1 tablet (500 mg total) by mouth every 12 (twelve) hours for 7 days No ETOH with use 14 tablet 0     No current facility-administered medications for this visit  No Known Allergies    Review of Systems   Constitutional: Negative  Respiratory: Negative  Cardiovascular: Negative  Gastrointestinal: Negative  Genitourinary: Positive for vaginal discharge  Negative for dysuria, hematuria, pelvic pain and urgency  Neurological: Negative  Physical Exam     I spent 15 minutes with the patient during this visit

## 2023-01-10 NOTE — PROGRESS NOTES
FIRST TRIMESTER OBSTETRIC ULTRASOUND  1/10/2023   Pedro Chávez MD     INDICATION: Amenorrhea, viability    Patient is a 43 yo  who presents today for a viability scan  She reports her LMP as 22, giving her an BRIT of 23  COMPARISON: None  TECHNIQUE:   Transvaginal imaging was performed to assess the gestation, myometrial/endometrial architecture and ovarian parenchymal detail  The study includes volumetric sweeps and traditional still imaging technique  FINDINGS:     A single intrauterine gestation is identified  Cardiac activity is detected at 175 bpm       YOLK SAC:  Present and normal in size and appearance  MEAN CROWN RUMP LENGTH:  32 mm = 10 weeks 1 days   AMNIOTIC FLUID/SAC SHAPE:  Within expected normal range  UTERUS/ADNEXA:   No adnexal mass or pathologic cyst   No free fluid identified  IMPRESSION:     Single intrauterine pregnancy of 10 weeks 1d gestational age by US  Fetal cardiac activity detected  No adnexal masses seen    BRIT 23 by LMP for final dating

## 2023-01-12 ENCOUNTER — ULTRASOUND (OUTPATIENT)
Dept: OBGYN CLINIC | Facility: CLINIC | Age: 38
End: 2023-01-12

## 2023-01-12 ENCOUNTER — APPOINTMENT (OUTPATIENT)
Dept: LAB | Facility: CLINIC | Age: 38
End: 2023-01-12

## 2023-01-12 VITALS
DIASTOLIC BLOOD PRESSURE: 86 MMHG | SYSTOLIC BLOOD PRESSURE: 137 MMHG | HEIGHT: 66 IN | HEART RATE: 103 BPM | BODY MASS INDEX: 34.72 KG/M2 | WEIGHT: 216 LBS

## 2023-01-12 DIAGNOSIS — O36.80X0 ENCOUNTER TO DETERMINE FETAL VIABILITY OF PREGNANCY, SINGLE OR UNSPECIFIED FETUS: ICD-10-CM

## 2023-01-12 DIAGNOSIS — O36.80X0 ENCOUNTER TO DETERMINE FETAL VIABILITY OF PREGNANCY, SINGLE OR UNSPECIFIED FETUS: Primary | ICD-10-CM

## 2023-01-12 LAB
ABO GROUP BLD: NORMAL
AMORPH URATE CRY URNS QL MICRO: ABNORMAL
BACTERIA UR QL AUTO: ABNORMAL /HPF
BASOPHILS # BLD AUTO: 0.03 THOUSANDS/ÂΜL (ref 0–0.1)
BASOPHILS NFR BLD AUTO: 0 % (ref 0–1)
BILIRUB UR QL STRIP: NEGATIVE
BLD GP AB SCN SERPL QL: NEGATIVE
CLARITY UR: ABNORMAL
COLOR UR: ABNORMAL
EOSINOPHIL # BLD AUTO: 0.04 THOUSAND/ÂΜL (ref 0–0.61)
EOSINOPHIL NFR BLD AUTO: 1 % (ref 0–6)
ERYTHROCYTE [DISTWIDTH] IN BLOOD BY AUTOMATED COUNT: 12.9 % (ref 11.6–15.1)
GLUCOSE UR STRIP-MCNC: ABNORMAL MG/DL
HBV SURFACE AG SER QL: NORMAL
HCT VFR BLD AUTO: 37.1 % (ref 34.8–46.1)
HCV AB SER QL: NORMAL
HGB BLD-MCNC: 12.3 G/DL (ref 11.5–15.4)
HGB UR QL STRIP.AUTO: NEGATIVE
HIV 1+2 AB+HIV1 P24 AG SERPL QL IA: NORMAL
HIV 2 AB SERPL QL IA: NORMAL
HIV1 AB SERPL QL IA: NORMAL
HIV1 P24 AG SERPL QL IA: NORMAL
IMM GRANULOCYTES # BLD AUTO: 0.04 THOUSAND/UL (ref 0–0.2)
IMM GRANULOCYTES NFR BLD AUTO: 1 % (ref 0–2)
KETONES UR STRIP-MCNC: ABNORMAL MG/DL
LEUKOCYTE ESTERASE UR QL STRIP: ABNORMAL
LYMPHOCYTES # BLD AUTO: 1.7 THOUSANDS/ÂΜL (ref 0.6–4.47)
LYMPHOCYTES NFR BLD AUTO: 19 % (ref 14–44)
MCH RBC QN AUTO: 29.9 PG (ref 26.8–34.3)
MCHC RBC AUTO-ENTMCNC: 33.2 G/DL (ref 31.4–37.4)
MCV RBC AUTO: 90 FL (ref 82–98)
MONOCYTES # BLD AUTO: 0.55 THOUSAND/ÂΜL (ref 0.17–1.22)
MONOCYTES NFR BLD AUTO: 6 % (ref 4–12)
MUCOUS THREADS UR QL AUTO: ABNORMAL
NEUTROPHILS # BLD AUTO: 6.42 THOUSANDS/ÂΜL (ref 1.85–7.62)
NEUTS SEG NFR BLD AUTO: 73 % (ref 43–75)
NITRITE UR QL STRIP: NEGATIVE
NON-SQ EPI CELLS URNS QL MICRO: ABNORMAL /HPF
NRBC BLD AUTO-RTO: 0 /100 WBCS
PH UR STRIP.AUTO: 6.5 [PH]
PLATELET # BLD AUTO: 309 THOUSANDS/UL (ref 149–390)
PMV BLD AUTO: 9.5 FL (ref 8.9–12.7)
PROT UR STRIP-MCNC: ABNORMAL MG/DL
RBC # BLD AUTO: 4.11 MILLION/UL (ref 3.81–5.12)
RBC #/AREA URNS AUTO: ABNORMAL /HPF
RH BLD: POSITIVE
RUBV IGG SERPL IA-ACNC: 52.1 IU/ML
SP GR UR STRIP.AUTO: 1.03 (ref 1–1.03)
SPECIMEN EXPIRATION DATE: NORMAL
UROBILINOGEN UR STRIP-ACNC: 2 MG/DL
WBC # BLD AUTO: 8.78 THOUSAND/UL (ref 4.31–10.16)
WBC #/AREA URNS AUTO: ABNORMAL /HPF

## 2023-01-12 RX ORDER — SWAB
1 SWAB, NON-MEDICATED MISCELLANEOUS DAILY
Qty: 30 TABLET | Refills: 0 | Status: SHIPPED | OUTPATIENT
Start: 2023-01-12

## 2023-01-13 LAB
BACTERIA UR CULT: ABNORMAL
RPR SER QL: NORMAL

## 2023-01-25 ENCOUNTER — INITIAL PRENATAL (OUTPATIENT)
Dept: OBGYN CLINIC | Facility: CLINIC | Age: 38
End: 2023-01-25

## 2023-01-25 VITALS
HEART RATE: 82 BPM | SYSTOLIC BLOOD PRESSURE: 123 MMHG | DIASTOLIC BLOOD PRESSURE: 81 MMHG | HEIGHT: 66 IN | WEIGHT: 222.4 LBS | BODY MASS INDEX: 35.74 KG/M2

## 2023-01-25 DIAGNOSIS — Z34.91 PRENATAL CARE IN FIRST TRIMESTER: Primary | ICD-10-CM

## 2023-01-25 DIAGNOSIS — Z59.9 FINANCIAL DIFFICULTIES: ICD-10-CM

## 2023-01-25 DIAGNOSIS — Z59.41 FOOD INSECURITY: ICD-10-CM

## 2023-01-25 DIAGNOSIS — Z59.82 INABILITY TO ACQUIRE TRANSPORTATION: ICD-10-CM

## 2023-01-25 SDOH — ECONOMIC STABILITY - INCOME SECURITY: PROBLEM RELATED TO HOUSING AND ECONOMIC CIRCUMSTANCES, UNSPECIFIED: Z59.9

## 2023-01-25 SDOH — ECONOMIC STABILITY - TRANSPORTATION SECURITY: TRANSPORTATION INSECURITY: Z59.82

## 2023-01-25 SDOH — ECONOMIC STABILITY - FOOD INSECURITY: FOOD INSECURITY: Z59.41

## 2023-01-25 NOTE — LETTER
Dentist Letter            01/25/23          Bebo Vargas  1985  [id number]  [facility] [address]               We have had several requests from local dentist requesting permission to perform procedures on our patients who are pregnant  We wish to respond with this letter regarding some of the more routine procedures that we have been asked about  The following procedures may be performed on our obstetric patients:   1  Administration of local anesthesia   2  Administration of antibiotics such as PCN, Ampicillin, and Erythromycin  3  Administration of pain medications such as Tylenol, Tylenol with codeine, and if needed Percocet  4  Shielded X-rays    Should you have any questions, please do not hesitate to contact at [phone]          Sincerely,    [address] [facility]  [phone]

## 2023-01-25 NOTE — PROGRESS NOTES
OB INTAKE INTERVIEW  Pt presents for OB intake  [id number]  OB History    Para Term  AB Living   4 2 2   1 2   SAB IAB Ectopic Multiple Live Births   1       2      # Outcome Date GA Lbr Lambert/2nd Weight Sex Delivery Anes PTL Lv   4 Current            3 SAB            2 Term            1 Term              Hx of  delivery prior to 36 weeks 6 days:  No   If yes, place a referral for cervical surveillance at 16 weeks  Last Menstrual Period:    Patient's last menstrual period was 2022  Ultrasound date: 2023  10 weeks 1 days     Estimated Date of Delivery: 23   by LMP  H&P visit scheduled  2023 @ 1130  with Dr June Smith      Last pap smear: 2018  Findings; lab pap smear results: no abnormalities    Current Issues:  Constipation :   Yes, denied blood RN educated pt on dietary changes   Headaches :   Yes, relieved on their own   Cramping:  No  Spotting :   No  PICA cravings :  No  FOB Involved: No  Planned pregnancy:  No  I have these concerns about this prenatal patient:   Obesity in pregnancy - first trimester ordered 1 hour glucose   SDOH insecurities - referral placed for [facility]     Interview education  • [facility] Pregnancy Essentials reviewed and discussed   • Baby and [address] Handout  • [facility] MFM Handout  • Discussed genetic testing - pt is interested   • Prenatal lab work: Scripts printed and given to pt  • Influenza vaccine given today: No, pt refused   • Discussed COVID vaccine - No, pt refused   • Discussed Tdap vaccine  Immunizations: There is no immunization history on file for this patient  Depression Screening Follow-up Plan: Patient's depression screening was negative with an [place] score of  4  Clinically patient does not have depression  No treatment is required     Nurse/Family Partnership- referral placed:  N/A   If yes, place referral for nurse family partnership  BMI Counseling: Body mass index is 35 9 kg/m²     Tobacco Cessation Counseling: former  vaping - quit 11/2022 The numerous health risks of tobacco consumption were discussed  Infection Screening: Does the pt have a hx of MRSA? No  If yes- please follow MRSA protocol and obtain a nasal swab for MRSA culture  The patient was oriented to our practice and all questions were answered    Interviewed by: Jeromy Cooney RN 01/25/23

## 2023-01-25 NOTE — LETTER
Work Letter    01/25/23      Frida Hernadez  1985  [address] [facility] [address]    Dear Frida Hernadez,      Your employee is a patient at [address] [place] [address]   We recommend that all pregnant women:    1  Have a well-ventilated workspace  2  Wear low-heeled shoes  3  Work no more than 40 hours per week  4  Have a 15 minute break every 2 hours and at least 30 minutes for a meal break  5  Use good body mechanics by bending at your knees to avoid back strain and lift no more than 20 pounds without assistance  Will need assistance with lifting over 20 lbs  6  Have ready access to bathrooms and water  She may continue to work until her due date unless medical complications arise  We anticipate she may return to work in 6-8 weeks after delivery       Sincerely,  [facility]  [address], [address] [facility]  [address]   OFFICE:  [phone]    OR  [address]  [address]  OFFICE:  [phone]

## 2023-01-25 NOTE — PATIENT INSTRUCTIONS
[facility] would like to say Congratulations on your pregnancy! We are happy that you have chosen us to be your health care provider during your pregnancy  Your health, the health of your unborn child (children) and your family is important to us  Now, more than ever, your health will be most important to you  Your baby's growth and progress can be affected by how well you take care of yourself  It's a good idea to plan ahead  It is a known fact that women who receive care early in pregnancy and throughout their pregnancy have healthier babies  Visits will be scheduled for you throughout your pregnancy  It is your duty to keep your appointments and follow directions for your care  The number of visits will be decided by your doctor  Don't be afraid to ask questions  Talk with your nurses and providers about your plans for birth and any concerns you may have  Maternal Fetal Medicine (MFM) at [phone]   1 hour appointment, only 1 support person allowed, NO children    Blood work : Please complete prior to your H&P appointment  You do NOT have to fast for any blood work unless instructed  CBC, Blood type and antibody screen, Urinalysis, Random glucose level, HIV, Syphilis, Hepatitis B  History &Physical: H&P appointment   Physical exam  Pelvic exam: GC/CT testing  If needed: Pap smear  Routine prenatal   Visits every 4 weeks until 28 weeks    Stay healthy during your pregnancy    Eat a variety of healthy foods  Healthy foods include fruits, vegetables, whole-grain breads, low-fat dairy foods, beans, lean meats, and fish  Drink liquids as directed  Ask how much liquid to drink each day and which liquids are best for you  Caffeine: It is not clear how caffeine affects pregnancy  Limit your intake of caffeine to avoid possible health problems  Limit caffeine to less than 200 milligrams each day     Caffeine may be found in coffee, tea, cola, sports drinks, and chocolate  Foods that contain mercury:  Mercury is naturally found in almost all types of fish and shellfish  Some types of fish absorb higher levels of mercury that can be harmful to an unborn baby  Eat only fish and shellfish that are low in mercury  Limit your intake of fish to 2 servings each week  Choose fish low in mercury such as canned light tuna, shrimp, crab, salmon, cod, or tilapia  Do not  eat fish high in mercury such as swordfish, tilefish, dia mackerel, and shark  Each week, you may eat up to 12 ounces of fish or shellfish that have low levels of mercury  These include shrimp, canned light tuna, salmon, pollock, and catfish  Eat only 6 ounces of albacore (white) tuna per week  Albacore tuna has more mercury than canned tuna  Take prenatal vitamins as directed  Your need for certain vitamins and minerals, such as folic acid, increases during pregnancy  Prenatal vitamins provide some of the extra vitamins and minerals you need  Prenatal vitamins may also help to decrease the risk of certain birth defects  Weight: Ask how much weight you should gain during your pregnancy  Too much or too little weight gain can be unhealthy for you and your baby  Exercise: Talk to your healthcare provider about exercise  Moderate exercise can help you stay fit  Your healthcare provider will help you plan an exercise program that is safe for you during pregnancy  Drink plenty of fluids while exercising to stay hydrated  Be careful to avoid overheating  AVOID exercises that can make you lose your balance  Activities that can put your baby at risk i e  horseback riding, scuba diving, skiing or snowboarding  After your first trimester, avoid exercises that require you to lay flat on your back  AVOID exceeding a heart rate greater than 140 beats per minute  As long as you are able to hold a conversation while exercising your heart rate is likely acceptable  ALWAYS wear your seat belt    The shoulder belt should lay across your chest (between your breasts) and away from your neck  Secure the lap belt below your belly so that it fits snugly across your hips and pelvic bone  Perform Kegel exercise  Imagine yourself stopping the flow of urine, jason the muscles of your pelvic floor  Hold that contraction for 10 seconds and release  They can be repeated 10-20 times per day  Do not smoke  If you smoke, it is never too late to quit  Smoking increases your risk of a miscarriage and other health problems during your pregnancy  Smoking can cause your baby to be born too early or weigh less at birth  Ask your healthcare provider for information if you need help quitting  Do not drink alcohol  Alcohol passes from your body to your baby through the placenta  It can affect your baby's brain development and cause fetal alcohol syndrome (FAS)  FAS is a group of conditions that causes mental, behavior, and growth problems  Alcohol:  Do not drink alcohol during pregnancy  Alcohol can increase your risk of a miscarriage (losing your baby)  Never use illegal or street drugs (such as marijuana or cocaine) while you are pregnant  Safety tips:  Avoid hot tubs and saunas  Do not use a hot tub or sauna while you are pregnant, especially during your first trimester  Hot tubs and saunas may raise your baby's temperature and increase the risk of birth defects  Avoid toxoplasmosis  This is an infection caused by eating raw meat or being around infected cat feces  It can cause birth defects, miscarriages, and other problems  Wash your hands after you touch raw meat  Make sure any meat is well-cooked before you eat it  Avoid raw eggs and unpasteurized milk  Use gloves or ask someone else to clean your cat's litter box while you are pregnant  Ask your healthcare provider about travel  The most comfortable time to travel is during the second trimester   Ask your healthcare provider if you can travel after 36 weeks  You may not be able to travel in an airplane after 36 weeks  He may also recommend that you avoid long road trips  Pregnancy Diet  WHAT YOU NEED TO KNOW:   What is a healthy diet during pregnancy? A healthy diet during pregnancy is a meal plan that provides the amount of calories and nutrients you need during pregnancy  Your body needs extra calories and nutrients to support your growing baby  You need to gain the right amount of weight for a healthy baby and pregnancy  Babies born at a healthy weight have a lower risk of certain health problems at birth and later in life  A healthy diet may help you avoid gaining too much weight  Too much weight gain may cause problems for you during your pregnancy and delivery  What should I AVOID while I am pregnant? Raw and undercooked foods: You should not eat undercooked or raw meat, poultry, eggs, fish, or shellfish (shrimp, crab, lobster)  Cook leftover foods and ready-to-eat foods such as hot dogs until they are steaming hot  Unpasteurized food:  Unpasteurized foods are foods that have not gone through the heating process (pasteurization) that destroys bacteria  You should not drink milk, juice, or cheese that has not been pasteurized  This includes Brie, feta, Camembert, blue, and [place] cheeses  Which foods can I eat while I am pregnant? Eat a variety of foods from each of the food groups listed below  Your dietitian will tell you how many servings you should have from each food group each day to get enough calories  The amount of calories you need depends on your daily activity, your weight before pregnancy, and current weight gain  Healthcare providers divide pregnancy into 3 periods of time called trimesters  In the first trimester, you usually do not need extra calories  In the second and third trimesters, most women should eat about 300 extra calories each day  What vitamin and mineral supplements may I need?   Your healthcare provider will tell you if you need a supplement and the type you should take  Talk to your healthcare provider before you take any other kind of supplement, including herbal (natural) supplements  Prenatal vitamins:  Eat a variety of healthy foods, even if you take a prenatal vitamin  If you forget to take your vitamin, do not take double the amount the next day  Folic acid:  You need at least 993 mcg of folic acid each day before you get pregnant  Folic acid helps to form your baby's brain and spinal cord in early pregnancy  During pregnancy, your daily need for folic acid increases to about 600 mcg  Get folic acid each day by eating citrus fruits and juices, green leafy vegetables, liver, or dried beans  Folic acid is also added to foods such as breakfast cereals, bread products, flour, and pasta  Iron:  Iron is a mineral the body needs to make hemoglobin, which is a part of red blood cells  Hemoglobin helps your blood carry oxygen from the lungs to the rest of your body  Foods that are good sources of iron are meat, poultry, fish, beans, spinach, and fortified cereals and breads  Your body will absorb iron better from non-meat sources if you have a source of vitamin C at the same time  Drink tea and coffee separately from iron-fortified foods and iron supplements  You need about 30 mg of iron each day during pregnancy  Calcium and vitamin D:  Women who do not eat dairy products may need a calcium and vitamin D supplement  Talk to your healthcare provider about calcium supplements if you do not regularly eat good sources of calcium  The amount of calcium you need is about 1,300 mg if you are between 15and 25years old and 1,000 mg if you are 23to 48years old  What other healthy guidelines should I follow? Vegetarians and vegans: If you are a vegetarian or vegan, get enough protein, vitamin B12, and iron during your pregnancy   Some non-meat sources of these nutrients are fortified cereals, nut butters, soy products (tofu and soymilk), nuts, grains, and legumes  These nutrients are also found in eggs and milk products  Cravings: You may have cravings for certain foods during your pregnancy  Foods that are high in calories, fat, and sugar should not replace healthy food choices  Some women have cravings for unusual substances such as annette, dirt, laundry starch, ice, and chalk  This condition is called pica  These may lead to health problems such as anemia and cause other health problems  Nausea and Vomiting in Pregnancy  Nausea and vomiting in pregnancy  can happen any time of day  These symptoms usually start before the 9th week of pregnancy, and end by the 14th week (second trimester)  Some women can have nausea and vomiting for a longer time  These symptoms can affect some women throughout the entire pregnancy  Nausea and vomiting do not harm your baby  These symptoms can make it hard for you to do your daily activities  Seek care immediately if:   You have signs of dehydration  Examples are dark yellow urine, dry mouth and lips, dry  skin, fast heartbeat, and urinating less than usual   You have severe abdominal pain  You feel too weak or dizzy to stand up  You see blood in your vomit or bowel movements  Contact your healthcare provider if:   You vomit more than 4 times in 1 day  You have not been able to keep liquids down for more than 1 day  You lose more than 2 pounds  You have a fever  Your nausea and vomiting continue longer than 14 weeks  You have questions or concerns about your condition or care  Treatment  for nausea and vomiting in pregnancy is usually not needed  Talk to your healthcare provider if your symptoms do not decrease with the changes suggested below  You may need vitamin B6 and medicine if these changes do not help, or your symptoms become severe  Nutrition changes you can make to manage nausea and vomiting:   Eat small meals throughout the day instead of 3 large meals  You may be more likely to have nausea and vomiting when your stomach is empty  Eat foods that are low in fat and high in protein  Examples are lean meat, beans, turkey, and chicken without the skin  Eat a small snack, such as crackers, dry cereal, or a small sandwich before you go to bed  Eat some crackers or dry toast before you get out of bed in the morning  Get out of bed slowly  Sudden movements could cause you to get dizzy and nauseated  Eat bland foods when you feel nauseated  Examples of bland foods include dry toast, dry cereal, plain pasta, white rice, and bread  Other bland foods include saltine crackers, bananas, gelatin, and pretzels  Avoid spicy, greasy, and fried foods  Avoid any other foods that make you feel nauseated  Drink liquids that contain ginger  Drink ginger ale made with real ginger or ginger tea made with fresh grated ginger  Ginger capsules or ginger candies may also help to decrease nausea and vomiting  Drink liquids between meals instead of with meals  Wait at least 30 minutes after you eat to drink liquids  Drink small amounts of liquids often throughout the day to prevent dehydration  Ask how much liquid you should drink each day  Other changes you can make to manage nausea and vomiting:   Avoid smells that bother you  Strong odors may cause nausea and vomiting to start, or make it worse  Take a short walk, turn on a fan, or try to sleep with the window open to get fresh air  When you are cooking, open windows to get rid of smells that may cause nausea  Do not brush your teeth right after you eat  if it makes you nauseated  Rest when you need to  Start activity slowly and work up to your usual routine as you start to feel better  Talk to your healthcare provider about your prenatal vitamins  Prenatal vitamins can cause nausea for some women  Try taking your prenatal vitamin at night or with a snack   If this change does not help, your healthcare provider may recommend a different type of vitamin  Do not use any medicines, vitamins, or supplements to manage your symptoms without asking your healthcare provider  Many medicines can harm an unborn baby  Light to moderate exercise  may help to decrease your symptoms  It may also help you to sleep better at night  Ask your healthcare provider about the best exercise plan for you  Breastfeeding   Benefits of breastfeeding  Are less likely to develop allergies  Have increased protection against bacterial meningitis  Have fewer middle ear infections  Have fewer learning disabilities  Have fewer behavioral difficulties  Have higher IQ's  Have lower rates of respiratory illness  Have lower obesity  Are less likely to develop juvenile diabetes and some childhood cancers  Are less likely to die from Sudden Infant Death Syndrome  A healthier baby means fewer visits to the doctor and the pharmacy  Breastfeeding is environmentally friendly  There is nothing to throw away  Breastfeeding is free; formula can cost over $1000 for the first year of life  There is less vaginal bleeding immediately after delivery and a faster return to your pre-pregnant size  Mothers who breastfeed a lifetime total of 2 years have:  A 40% decreased risk of breast cancer   A decreased risk of ovarian cancer  Lower rates of osteoporosis, diabetes and heart disease  Importance of exclusive breastfeeding  By providing the ideal food for the healthy growth and development of infants, exclusive  infants receive only breast milk  Exclusive breastfeeding for the first 6 months is very important to improve an infant's health and reduce childhood illness  Exclusive breastfeeding for the first 6 months  The American Academy of Pediatrics recommends exclusive breastfeeding for the first six months and continued breastfeeding with supplementation of solids for the next 6 months       Early initiation of breastfeeding  Women who feed their infants within the first hour of birth is referred to as Zeb Flack initiation of breastfeeding” and ensures that the  receives colostrum  Colostrum is rich in antibodies and essential nutrients  Rooming-In  May stabilize 's breathing and heart rate  Reduce crying  Improve ability for  to maintain temperature and blood sugar levels  Early stimulation of baby's immune system  Calming effects  Easier and faster bonding   Rooming-in promotes getting to know your   Rooming-in makes breastfeeding easier  Women who room-in with their newborns make more milk and produce a good milk supply earlier  Becomes easier to recognize baby's feeding cues  Infants have longer breastfeeding sessions  Women who room-in with their newborns are more likely to exclusively breastfeed compared to women who are  from their newborns  Skin-to-Skin  Skin to skin contact should happen regardless of a woman's feeding preference or the mode of delivery  After the delivery of your baby, it's important that a healthy mother and her baby have uninterrupted skin-to-skin contact  Skin to skin contact should occur immediately after birth for a least one-two hours and until after the first feeding for breastfeeding mothers  Skin-to-skin contact means placing dried, unclothes newborns on their mother's bare chest, with warm blankets covering the baby's back  All routine procedures such as maternal and  assessments can take place during skin-to-skin contact or can be delayed until after the sensitive period immediately after birth  We are proud to offer extensive educational and support services to encourage mothers to breastfeed  This service offers information, education, and support for women who want to breast feed  Mothers can leave a message or breastfeeding question on the line anytime of the day  Nurses will respond within 72 hours  The Breast Feeding Answer Line is [phone] ([phone])   Please DO NOT leave urgent or emergent messages on this message line  Please DO contact your physician DIRECTLY if you have any urgent questions or concerns  In the event of a suspected emergency medical condition please CALL 911 or [address]      Attaching your baby at the Breast (English): [email or web address]    Attaching your baby at the Breast (Urdu):  [email or web address]        Coronavirus (COVID-19) pregnancy FAQs: Medical experts answer your questions  From [employer] cy  com/0_coronavirus-covid-19-pregnancy-faq-medical-syenvqh-trvedf-cn_54033988  bc (courtesy of [facility])  As of 3/18/20  By Jyotsna Brown 37  Medically reviewed by Society for Maternal-Fetal Medicine       We asked parents-to-be to send us their most pressing questions about coronavirus (COVID-19)  Among them: Is it still safe to deliver in a hospital? What if my ob-gyn has the virus? We sent those questions to the top docs at the Society for Maternal-Fetal Medicine  Here are their answers  The coronavirus (COVID-19) pandemic has been declared a national emergency in the [facility] by [employer]  Moms-to-be like you are concerned about everything from getting medicines to managing disruptions at work  But above and beyond any worry about lifestyle changes is a focus on your health and the impact of COVID-19 on your pregnancy  We asked obstetrics doctors who handle the most complicated pregnancy issues to answer your questions about the coronavirus  Here are their responses, provided by Dr Jeff Silva and her colleagues at the Society for [place] [employer]  Am I at more risk for COVID-19 if I'm pregnant? We don't know   Pregnancy does change your immune system in ways that might make you more susceptible to viral respiratory infections like COVID-19  If you become infected, you might also be at higher risk for more severe illness compared to the general population  Although this does not appear to be the case with COVID-19, based on limited data so far, a higher risk has been true for pregnant women with other coronavirus infections (SARS-CoV and MERS-CoV) and other viral respiratory infections, such as flu  It's important to take preventive actions to avoid infection, such as washing your hands often and avoiding people who are sick  How might coronavirus affect my pregnancy? Again, we don't know  Women with other coronavirus infections (such as SARS-CoV) did not have miscarriage or stillbirth at higher rates than the general population  We know that having other respiratory viral infections during pregnancy, such as flu, has been associated with problems like low birth weight and  birth  Also, having a high fever early in pregnancy may increase the risk of certain birth defects  Could I transmit coronavirus to my baby during pregnancy or delivery? We don't know whether you could transmit COVID-19 to your baby before or during delivery  However, among the few case studies of infants born to mothers with COVID-23 published in peer-reviewed literature, none of the infants tested positive for the virus  Also, there was no virus detected in samples of amniotic fluid or breast milk  There have been a few reports of newborns as young as a few days old with infection, suggesting that a mother can transmit the infection to her infant through close contact after delivery  There have been no reports of mother-to-baby transmission for other coronaviruses (MERS-CoV and SARS-CoV), although only limited information is available  Is it safe for me to deliver at a hospital where there have been COVID-19 cases? Yes  We know that COVID-19 is a very scary virus   The good news is that hospitals are taking great precautions to keep patients and healthcare providers safe  When a patient is even suspected to have COVID-19, they are placed in a negative pressure room  (Think of these rooms as vacuums that suck and filter the air so it's safe for the other people in the hospital)  This makes it possible for you to deliver at the hospital without putting you or your baby at risk  Hospitals are also implementing stricter visiting policies to keep patients safe  It's worth calling your hospital to check if there are any new regulations to be aware of  What plans should I make now in case the hospital system is overwhelmed when it's time for me to deliver? This is a great question to talk with your doctor or midwife about when you're 34 to 36 weeks pregnant  Every hospital is making different plans for dealing with this scenario  I work in healthcare  Should I ask my doctor to excuse me from work until the baby is born? What if I work in a school, the travel [employer], or some other high-risk setting? Healthcare facilities should take care to limit the exposure of pregnant employees to patients with confirmed or suspected COVID-19, just as they would with other infectious cases  If you continue working, be sure to follow the CDC's risk assessment and infection control guidelines  If you work in a school, travel [employer], or other high-risk setting, talk with your employer about what it's doing to protect employees and minimize infection risks  Wash your hands often  What if my OB gets COVID-19? If your doctor or midwife tests positive for COVID-19, they will need to be quarantined until they recover and are no longer at risk of transmitting the virus  In this case, you'll be assigned to another OB in your doctor's practice (or you may choose another practitioner yourself)  Ask your new OB or your doctor's office if you should self-quarantine or be tested for the virus   (It will depend on when you last saw your provider and when that person tested positive )    Should we hold off on trying to conceive because of COVID-19? At this time, there's no reason to hold off on trying to get pregnant, but the data we have is really limited  For example, we don't think the virus causes birth defects or increases your risk of miscarriage  But we don't know whether you could transmit COVID-19 to your baby before or during delivery  We also don't know if the virus lives in semen or can be sexually transmitted  We have a babymoon scheduled in the next few months - should we cancel? If you're planning to travel internationally or on a cruise, you should strongly consider canceling  At this time, the virus has reached more than 140 countries, and there are travel bans to [place], most of [place], and [place]  Places where large numbers of people gather are at highest risk, especially airports and cruise ships  If you're planning travel in [place] , note that any travel setting increases your risk of exposure, and there may be travel bans even in [facility] by the time you're scheduled to go  Even if you're state is not blocked, you'll definitely want to avoid traveling to communities where the virus is spreading  To find out where these places are, check The [place] Times map based on CDC data  For the most current advice to help you avoid exposure, check the CDC's COVID-19 travel page  Will the hospital separate me from my  and keep the baby in quarantine? If you test positive for COVID-19 or have been exposed but have no symptoms, the CDC, [employer] of Obstetricians and Gynecologists, and the Society for [place] [employer] all recommend that you be  from your baby to decrease the risk of transmission to the baby  This would last until you are no longer at risk of transmitting the virus   If you do not have COVID-19 and have not been exposed to the virus, the hospital will not separate you from your   My hospital is restricting visitors and only allowing one support person  If my support person leaves after the delivery, will they be allowed to come back? Every hospital has different policies  Contact your hospital or labor and delivery unit a week or so before delivery to get the most up-to-date restrictions  In general, if your support person needs to leave, they would be allowed back unless they knew they were exposed to COVID-19 after leaving your company  Indra understands that the coronavirus (COVID-19) pandemic is an evolving story and that your questions will change over time  We'll continue asking moms and dads in our Community what they want to know, and we'll get the answers from experts to keep them - and you - informed and supported  My mom was planning to fly here to help me care for my new baby after delivery  Should I tell her not to come? Yes  If your mom is over 61 or has any serious chronic medical conditions (such as heart disease, lung disease, or diabetes), she is at higher risk of serious illness from COVID-19 and should avoid air travel  And remember that any travel setting increases a person's risk of exposure  So, it may be risky to have her around the baby after she has been traveling  For the most current advice on traveling, check the CDC's COVID-19 travel page  Indra understands that the coronavirus pandemic is an evolving story and that your questions will change over time  We'll continue asking moms and dads in our Community what they want to know, and we'll get the answers from experts to keep them - and you - informed and supported

## 2023-01-25 NOTE — LETTER
Proof of Pregnancy Letter      01/25/23            Pedrito Ríos  1985  [id number]  [facility] Nichelle Greenberg is a patient at our facility  Pedrito Shepherda Estimated Date of Delivery: 8/8/23       Any questions or concerns, please feel free to contact our office        Sincerely,      [address], [address] [facility], [address]   Office:  [phone]

## 2023-01-25 NOTE — LETTER
[facility] Letter    01/25/23        Iker Marquez  1985  [phone]  [facility] [address]       Iker Marquez is a patient and under our care in our office  Tiny Benito Bennett's Estimated Date of Delivery: 8/8/23  Any questions or concerns feel free to contact our office           Respectfully,    [address] [facility]  [id number] [facility]/[address]  [place]  [phone]   [facility]/[address]  [phone]

## 2023-01-31 NOTE — PROGRESS NOTES
OB/GYN  PRENATAL H&P VISIT  Gabirel Rodriguez  2023  10:53 AM  Dr Santiago Pugh MD      SUBJECTIVE  Patient is a [id number] at 13w2d here for initial prenatal H&P  Her obstetric history is notable for 2 prior term vaginal deliveries***  She is currently doing well***  She works at [employer] She *** hx of STD/STI, denies a hx of TB or close contacts with persons with TB  She *** a family history of inheritable conditions such as physical or intellectual disabilities, birth defects, blood disorders, heart or neural tube defects  She *** had MRSA  She *** recent travel or travel planned in the near future  She *** use of nicotine or recreational drug use  She denies vaginal bleeding, cramping, leakage, abnormal discharge       OB History    Para Term  AB Living   4 2 2 0 1 2   SAB IAB Ectopic Multiple Live Births   1 0 0 0 2      # Outcome Date GA Lbr Lambert/2nd Weight Sex Delivery Anes PTL Lv   4 Current            3 SAB            2 Term            1 Term                Review of Systems    Past Medical History:   Diagnosis Date   • Urinary tract infection with hematuria 2018       Past Surgical History:   Procedure Laterality Date   • CORRECTION HAMMER TOE Right    • TOE SURGERY Right        Social History     Socioeconomic History   • Marital status: Single     Spouse name: Not on file   • Number of children: Not on file   • Years of education: Not on file   • Highest education level: Not on file   Occupational History   • Not on file   Tobacco Use   • Smoking status: Former     Types: Cigars     Quit date: 3/15/2005     Years since quittin 8   • Smokeless tobacco: Never   • Tobacco comments:     black and milds, occassionally   Vaping Use   • Vaping Use: Former   • Quit date: 2022   • Substances: Nicotine   Substance and Sexual Activity   • Alcohol use: Not Currently     Alcohol/week: 2 0 standard drinks     Types: 2 Glasses of wine per week     Comment: monthly   • Drug use: No   • Sexual activity: Yes     Partners: Male     Birth control/protection: None   Other Topics Concern   • Not on file   Social History Narrative   • Not on file     Social Determinants of Health     Financial Resource Strain: Medium Risk   • Difficulty of Paying Living Expenses: Somewhat hard   Food Insecurity: No Food Insecurity   • Worried About Running Out of Food in the Last Year: Never true   • Ran Out of Food in the Last Year: Never true   Transportation Needs: Unmet Transportation Needs   • Lack of Transportation (Medical): Yes   • Lack of Transportation (Non-Medical): Yes   Physical Activity: Not on file   Stress: Not on file   Social Connections: Not on file   Intimate Partner Violence: Not At Risk   • Fear of Current or Ex-Partner: No   • Emotionally Abused: No   • Physically Abused: No   • Sexually Abused: No   Housing Stability: High Risk   • Unable to Pay for Housing in the Last Year: Yes   • Number of Places Lived in the Last Year: 1   • Unstable Housing in the Last Year: No       OBJECTIVE  There were no vitals filed for this visit  OBGyn Exam    ASSESSMENT AND PLAN    42 y o , [id number], with LMP 2022 , at 13w2d here for her prenatal H&P  FHT *** by ***    1  Pregnancy: H&P completed today  PN Labs reviewed today  Labor expectations discussed with patient, including appointment schedule, nutrition, exercise, medications, sexual intercourse, and nausea/vomiting  Patient's BMI is 35  Recommended weight gain is 11-20lbs  2  Screening: Pap smear ***  GC/CT ***collected  3  Consents: Delivery process including potential OVD and  reviewed  Sign delivery consent form at 28 weeks  4  Labor: For analgesia, patient plans on ***     5  Postpartum: Patient plans on *** breastfeeding  Different methods of contraception were discussed with patient, including progesterone only oral pills, depo provera, nexplanon, mirena, and paragard   Patient would like to use *** during postpartum phase     6  Follow up: RTC in 4 weeks  Precautions regarding labor, leakage, bleeding, and fetal movement reviewed      Tim Jernigan MD  1/31/2023  10:53 AM

## 2023-02-01 NOTE — PROGRESS NOTES
Prenatal H&P  [facility]    Subjective:  Patient is a G 4 P  here for initial prenatal H&P  This is an unintended pregnancy  Patient reports that her LMP was 2022  Patient is currently doing well  She is here by herself   Her previous pregnancies have been 2  last 2011, previous was 2003  She did have an 8wk  SAB 2019  She currently is not working   She has a fair support system at home- will have her meet with Jf ACUÑA    She does not have a known family history of inheritable conditions such as physical or intellectual disabilities, birth defects, blood disorders, heart or neural tube defects  She denies recent travel  She denies recent  use of nicotine- she quit vaping   No EtOH or recreational drug use  Negative history of MRSA  Refused COVID vaccines  Refused flu vaccine    OB History    Para Term  AB Living   4 2 2   1 2   SAB IAB Ectopic Multiple Live Births   1       2      # Outcome Date GA Lbr Lambert/2nd Weight Sex Delivery Anes PTL Lv   4 Current            3 SAB 19     SAB      2 Term 11   3345 g (7 lb 6 oz) M Vag-Spont  N NAN   1 Term 03   3770 g (8 lb 5 oz) M Vag-Spont  N NAN         Objective:   /76 (BP Location: Left arm, Patient Position: Sitting, Cuff Size: Adult)   Pulse 99   Resp 18   Ht 5' 6" (1 676 m)   Wt 101 kg (222 lb)   LMP 2022   BMI 35 83 kg/m²     General:   alert and oriented, in no acute distress, alert, appears stated age and cooperative   Heart: regular rate and rhythm, S1, S2 normal, no murmur, click, rub or gallop   Lungs: clear to auscultation bilaterally   Abdomen: soft, non-tender, without masses or organomegaly   Vulva: Bartholin's, Urethra, Olivette's normal   Vagina: normal discharge   Cervix: no cervical motion tenderness   Uterus: size consistent with 13 weeks   Adnexa: normal adnexa and no mass, fullness, tenderness         Assessment and Plan:  1   LMP 2022 with an BRIT 8/8/2023  Working BRIT  2  TVUS showed EGA 10 weeks 1 day on 1/12/2023 with an BRIT 8/9/2023  3  Pap smear was done today  Her last Pap and HPV was done 3/26/2018 and was negative with negative high-risk HPV, history of abnormal in 2014  4  GC/CT collected  5  Prenatal labs WNL, hemoglobin 12 3, she needs to complete 1 hour GTT- did today not resulted, will add hgb electrophoresis  Will check urine dip and culture- has some symptoms  6  Postpartum: patient plans on  breastfeeding  Different methods of contraception were discussed with patient, including progesterone only oral pills, depo provera, nexplanon, mirena, and paragard  Patient would like to use -unsure  7  Delivery consent form to be signed at 28 weeks  8  Sequential screening: Appointment today with MFM  9  Labor expectations discussed with patient, including appointment schedule, nutrition, weight gain, sexual intercourse, and nausea and vomiting  Obesity in pregnancy  11- 20 lbs total weight  recommended in pregnancy  10  RTC in 4 weeks  Continue PNV QD   11 AMA- 3rd trimester growth scan, APFS begin at 37 wks   LDASA 162 mgs daily until 36 wks    Problem List        Genitourinary    Urinary tract infection with hematuria    Bacterial vaginosis       Other    History of abnormal cervical Pap smear    UTI symptoms    Encounter for gynecological examination without abnormal finding    Routine screening for STI (sexually transmitted infection)    Vaginal odor    First trimester pregnancy    Overview     Sequential screen 2/2/2023  NIPT  MASFP  Needs to complete 1 hour GTT  LDASA 162 mgs daily  Declined COVID vaccines  Declines flu vaccine  Contraception- unsure  Bottle or breast-feeding         11 weeks gestation of pregnancy    Multigravida of advanced maternal age in first trimester    Overview     3rd trimester growth scan  LDASA 162 mgs until 36 wks         Obesity affecting pregnancy in first trimester    Overview     11-20 lbs recommended in pregnancy          Other Visit Diagnoses     Pap smear, as part of routine gynecological examination        Screen for STD (sexually transmitted disease)        Encounter for urine test

## 2023-02-02 ENCOUNTER — APPOINTMENT (OUTPATIENT)
Dept: LAB | Facility: CLINIC | Age: 38
End: 2023-02-02

## 2023-02-02 ENCOUNTER — ROUTINE PRENATAL (OUTPATIENT)
Dept: OBGYN CLINIC | Facility: CLINIC | Age: 38
End: 2023-02-02

## 2023-02-02 ENCOUNTER — ROUTINE PRENATAL (OUTPATIENT)
Facility: HOSPITAL | Age: 38
End: 2023-02-02

## 2023-02-02 VITALS
SYSTOLIC BLOOD PRESSURE: 108 MMHG | BODY MASS INDEX: 36.03 KG/M2 | WEIGHT: 224.2 LBS | HEART RATE: 97 BPM | HEIGHT: 66 IN | DIASTOLIC BLOOD PRESSURE: 70 MMHG

## 2023-02-02 VITALS
BODY MASS INDEX: 35.68 KG/M2 | RESPIRATION RATE: 18 BRPM | SYSTOLIC BLOOD PRESSURE: 117 MMHG | HEART RATE: 99 BPM | DIASTOLIC BLOOD PRESSURE: 76 MMHG | WEIGHT: 222 LBS | HEIGHT: 66 IN

## 2023-02-02 DIAGNOSIS — Z01.89 ENCOUNTER FOR URINE TEST: ICD-10-CM

## 2023-02-02 DIAGNOSIS — O36.80X0 ENCOUNTER TO DETERMINE FETAL VIABILITY OF PREGNANCY, SINGLE OR UNSPECIFIED FETUS: ICD-10-CM

## 2023-02-02 DIAGNOSIS — O09.521 MULTIGRAVIDA OF ADVANCED MATERNAL AGE IN FIRST TRIMESTER: ICD-10-CM

## 2023-02-02 DIAGNOSIS — Z3A.11 11 WEEKS GESTATION OF PREGNANCY: ICD-10-CM

## 2023-02-02 DIAGNOSIS — Z3A.13 13 WEEKS GESTATION OF PREGNANCY: Primary | ICD-10-CM

## 2023-02-02 DIAGNOSIS — Z34.91 FIRST TRIMESTER PREGNANCY: Primary | ICD-10-CM

## 2023-02-02 DIAGNOSIS — Z34.91 PRENATAL CARE IN FIRST TRIMESTER: ICD-10-CM

## 2023-02-02 DIAGNOSIS — Z36.82 ENCOUNTER FOR (NT) NUCHAL TRANSLUCENCY SCAN: ICD-10-CM

## 2023-02-02 DIAGNOSIS — O99.211 OBESITY AFFECTING PREGNANCY IN FIRST TRIMESTER: ICD-10-CM

## 2023-02-02 DIAGNOSIS — Z01.419 PAP SMEAR, AS PART OF ROUTINE GYNECOLOGICAL EXAMINATION: ICD-10-CM

## 2023-02-02 DIAGNOSIS — Z11.3 SCREEN FOR STD (SEXUALLY TRANSMITTED DISEASE): ICD-10-CM

## 2023-02-02 LAB
GLUCOSE 1H P 50 G GLC PO SERPL-MCNC: 187 MG/DL (ref 40–134)
SL AMB  POCT GLUCOSE, UA: NORMAL
SL AMB LEUKOCYTE ESTERASE,UA: NORMAL
SL AMB POCT BILIRUBIN,UA: NORMAL
SL AMB POCT BLOOD,UA: NORMAL
SL AMB POCT CLARITY,UA: NORMAL
SL AMB POCT COLOR,UA: NORMAL
SL AMB POCT KETONES,UA: NORMAL
SL AMB POCT NITRITE,UA: NORMAL
SL AMB POCT PH,UA: 6
SL AMB POCT SPECIFIC GRAVITY,UA: 1.01
SL AMB POCT URINE PROTEIN: NORMAL
SL AMB POCT UROBILINOGEN: 0.2

## 2023-02-02 NOTE — PROGRESS NOTES
Patient chose to have [facility] Non-invasive Prenatal Screen with fetal sex  Patient given brochure and is aware [facility] will contact their insurance and coordinate coverage  Patient made aware she will need to respond to text message or e-mail from [employer] within 2 business days or testing will be run through insurance  Patient informed text message will come from area code  "415"  Provided [employer] # [phone] and web site : [employer]    "[place] your test online" card with barcode and test tube ID provided to patient  Reviewed [facility]'s web site states 5-7 business days for results via their portal    Sarta message will be sent to patient when MFM receives results /provider reviews  2 vials of blood drawn from left arm by Jen Gardner RN  Patient tolerated blood draw without difficulty  Specimens labeled with patient identifiers (name, date of birth, specimen collection date), order and specimen were verified with patient, packed and sent via [employer]  Copy of lab order scanned to Epic media  Maternal Fetal Medicine will have results in approximately 7-10 business days and will call patient or notify via [address]  Patient aware viewing lab result online will reveal fetal sex if ordered  Patient verbalized understanding of all instructions and no questions at this time

## 2023-02-02 NOTE — PROGRESS NOTES
[address]: Ms Julio C Blanco was seen today for nuchal translucency ultrasound  See ultrasound report under "OB Procedures" tab  Review of Systems   Constitutional: Negative for chills, fever and unexpected weight change  HENT: Negative for congestion, dental problem, facial swelling and sore throat  Eyes: Negative for visual disturbance  Respiratory: Negative for cough and shortness of breath  Cardiovascular: Negative for chest pain and palpitations  Gastrointestinal: Negative for diarrhea and vomiting  Endocrine: Negative for polydipsia  Genitourinary: Negative for dysuria and vaginal bleeding  Musculoskeletal: Negative for back pain and joint swelling  Skin: Negative for rash and wound  Allergic/Immunologic: Negative for immunocompromised state  Neurological: Negative for seizures and headaches  Hematological: Does not bruise/bleed easily  Psychiatric/Behavioral: Negative for dysphoric mood, hallucinations and suicidal ideas  Physical Exam  Constitutional:       General: She is not in acute distress  Appearance: Normal appearance  HENT:      Head: Normocephalic and atraumatic  Eyes:      Extraocular Movements: Extraocular movements intact  Cardiovascular:      Rate and Rhythm: Normal rate  Pulmonary:      Effort: Pulmonary effort is normal  No respiratory distress  Skin:     Findings: No erythema or rash  Neurological:      Mental Status: She is alert and oriented to person, place, and time  Psychiatric:         Mood and Affect: Mood normal          Behavior: Behavior normal          Please don't hesitate to contact our office with any concerns or questions    -Dwight Kruse MD

## 2023-02-02 NOTE — LETTER
February 2, 2023     Amada Ross, [address] [address]    Patient: Yaa Nguyen   YOB: 1985   Date of Visit: 2/2/2023       Dear Dr Freedman [place]: Thank you for referring Tara Marcos to me for evaluation  Below are my notes for this consultation  If you have questions, please do not hesitate to call me  I look forward to following your patient along with you  Sincerely,        Corinne Pontes, MD        CC: No Recipients  Corinne Pontes, MD  2/2/2023  4:12 PM  Sign when Signing Visit  [address]: Ms Rubens White was seen today for nuchal translucency ultrasound  See ultrasound report under "OB Procedures" tab  Review of Systems   Constitutional: Negative for chills, fever and unexpected weight change  HENT: Negative for congestion, dental problem, facial swelling and sore throat  Eyes: Negative for visual disturbance  Respiratory: Negative for cough and shortness of breath  Cardiovascular: Negative for chest pain and palpitations  Gastrointestinal: Negative for diarrhea and vomiting  Endocrine: Negative for polydipsia  Genitourinary: Negative for dysuria and vaginal bleeding  Musculoskeletal: Negative for back pain and joint swelling  Skin: Negative for rash and wound  Allergic/Immunologic: Negative for immunocompromised state  Neurological: Negative for seizures and headaches  Hematological: Does not bruise/bleed easily  Psychiatric/Behavioral: Negative for dysphoric mood, hallucinations and suicidal ideas  Physical Exam  Constitutional:       General: She is not in acute distress  Appearance: Normal appearance  HENT:      Head: Normocephalic and atraumatic  Eyes:      Extraocular Movements: Extraocular movements intact  Cardiovascular:      Rate and Rhythm: Normal rate  Pulmonary:      Effort: Pulmonary effort is normal  No respiratory distress     Skin: Findings: No erythema or rash  Neurological:      Mental Status: She is alert and oriented to person, place, and time  Psychiatric:         Mood and Affect: Mood normal          Behavior: Behavior normal          Please don't hesitate to contact our office with any concerns or questions    -Vicki Martinez MD

## 2023-02-03 LAB
BACTERIA UR CULT: NORMAL
C TRACH DNA SPEC QL NAA+PROBE: NEGATIVE
N GONORRHOEA DNA SPEC QL NAA+PROBE: NEGATIVE

## 2023-02-04 LAB
HGB A MFR BLD: 2.6 % (ref 1.8–3.2)
HGB A MFR BLD: 97.4 % (ref 96.4–98.8)
HGB F MFR BLD: 0 % (ref 0–2)
HGB FRACT BLD-IMP: NORMAL
HGB S MFR BLD: 0 %

## 2023-02-05 DIAGNOSIS — R73.09 ELEVATED GLUCOSE TOLERANCE TEST: Primary | ICD-10-CM

## 2023-02-06 ENCOUNTER — TELEPHONE (OUTPATIENT)
Dept: PERINATAL CARE | Facility: CLINIC | Age: 38
End: 2023-02-06

## 2023-02-06 ENCOUNTER — TELEPHONE (OUTPATIENT)
Dept: OBGYN CLINIC | Facility: CLINIC | Age: 38
End: 2023-02-06

## 2023-02-06 LAB
HPV HR 12 DNA CVX QL NAA+PROBE: NEGATIVE
HPV16 DNA CVX QL NAA+PROBE: NEGATIVE
HPV18 DNA CVX QL NAA+PROBE: NEGATIVE

## 2023-02-06 NOTE — TELEPHONE ENCOUNTER
----- Message from Karuna Remy MD sent at 2/5/2023  9:05 AM EST -----  Please inform patient that her glucose tolerance test was elevated  She will need to follow up with diabetes education at the [facility] , a referral was placed  I also ordered a hemoglobin A1C for her to complete

## 2023-02-06 NOTE — TELEPHONE ENCOUNTER
Patient joined visit for group Class 1; Pt had difficulty connecting and stated it was due to being in [place]  Explained to patient that we could not continue today's visit as I do not have licensure to practice outside of [place]  Pt understood and agreed to reschedule  Message sent to clerical team to notify of patients need to reschedule  MyChart message sent to patient with instructions to reschedule visit

## 2023-02-06 NOTE — RESULT ENCOUNTER NOTE
Cultures for chlamydia and gonorrhea and urine result is negative, please call patient to inform     Thank you

## 2023-02-07 ENCOUNTER — TELEPHONE (OUTPATIENT)
Facility: HOSPITAL | Age: 38
End: 2023-02-07

## 2023-02-07 NOTE — TELEPHONE ENCOUNTER
Called PT on 2/7/23 to schedule diabetic education  PT needs individual class with Mayela, 2 appointments with dietician (less than 16 weeks gestation) and early anatomy  PT did not answer, left voice message to call office back when available to schedule appts

## 2023-02-08 DIAGNOSIS — O36.80X0 ENCOUNTER TO DETERMINE FETAL VIABILITY OF PREGNANCY, SINGLE OR UNSPECIFIED FETUS: ICD-10-CM

## 2023-02-08 RX ORDER — PRENATAL VIT/IRON FUM/FOLIC AC 27MG-0.8MG
TABLET ORAL
Qty: 30 TABLET | Refills: 6 | Status: SHIPPED | OUTPATIENT
Start: 2023-02-08

## 2023-02-09 ENCOUNTER — TELEPHONE (OUTPATIENT)
Dept: OBGYN CLINIC | Facility: CLINIC | Age: 38
End: 2023-02-09

## 2023-02-09 LAB
LAB AP GYN PRIMARY INTERPRETATION: NORMAL
Lab: NORMAL

## 2023-02-09 NOTE — TELEPHONE ENCOUNTER
----- Message from Razia Ahumada, [address] sent at 2/6/2023 11:00 AM EST -----  Hemoglobin electrophoresis was normal, no thalassemia or sickle cell present,  please call patient to inform     Thanks

## 2023-02-10 ENCOUNTER — TELEPHONE (OUTPATIENT)
Dept: OBGYN CLINIC | Facility: CLINIC | Age: 38
End: 2023-02-10

## 2023-02-10 ENCOUNTER — TELEPHONE (OUTPATIENT)
Facility: HOSPITAL | Age: 38
End: 2023-02-10

## 2023-02-10 NOTE — TELEPHONE ENCOUNTER
PT called office and states she received notification of NIPS lab results, but looking for assistance reading report  PT informed of low risk results of NIPS  PT informed of MSAFP and timing of the test   PT is requesting gender, pt informed that gender was not run on the specimen  PT states she requested that  Pt informed that a message would be sent to [facility] to have gender run on the test   Pt was disappointed, but was receptive to information

## 2023-02-10 NOTE — TELEPHONE ENCOUNTER
----- Message from Kam Corbett, [address] sent at 2/9/2023  9:38 PM EST -----    Pap and HPV Result is negative, please call patient to inform     Thanks

## 2023-02-13 ENCOUNTER — TELEPHONE (OUTPATIENT)
Facility: HOSPITAL | Age: 38
End: 2023-02-13

## 2023-02-13 ENCOUNTER — APPOINTMENT (OUTPATIENT)
Dept: LAB | Facility: CLINIC | Age: 38
End: 2023-02-13

## 2023-02-13 ENCOUNTER — TELEMEDICINE (OUTPATIENT)
Facility: HOSPITAL | Age: 38
End: 2023-02-13

## 2023-02-13 VITALS — WEIGHT: 224 LBS | HEIGHT: 66 IN | BODY MASS INDEX: 36 KG/M2

## 2023-02-13 DIAGNOSIS — O99.212 OBESITY AFFECTING PREGNANCY IN SECOND TRIMESTER: ICD-10-CM

## 2023-02-13 DIAGNOSIS — Z3A.14 14 WEEKS GESTATION OF PREGNANCY: ICD-10-CM

## 2023-02-13 DIAGNOSIS — O24.419 GESTATIONAL DIABETES MELLITUS (GDM) IN SECOND TRIMESTER, GESTATIONAL DIABETES METHOD OF CONTROL UNSPECIFIED: ICD-10-CM

## 2023-02-13 DIAGNOSIS — O09.522 MULTIGRAVIDA OF ADVANCED MATERNAL AGE IN SECOND TRIMESTER: ICD-10-CM

## 2023-02-13 DIAGNOSIS — O24.419 GESTATIONAL DIABETES MELLITUS (GDM) IN SECOND TRIMESTER, GESTATIONAL DIABETES METHOD OF CONTROL UNSPECIFIED: Primary | ICD-10-CM

## 2023-02-13 LAB
ALBUMIN SERPL BCP-MCNC: 3.1 G/DL (ref 3.5–5)
ALP SERPL-CCNC: 78 U/L (ref 46–116)
ALT SERPL W P-5'-P-CCNC: 16 U/L (ref 12–78)
ANION GAP SERPL CALCULATED.3IONS-SCNC: 3 MMOL/L (ref 4–13)
AST SERPL W P-5'-P-CCNC: 12 U/L (ref 5–45)
BILIRUB SERPL-MCNC: 0.28 MG/DL (ref 0.2–1)
BUN SERPL-MCNC: 7 MG/DL (ref 5–25)
CALCIUM ALBUM COR SERPL-MCNC: 9.4 MG/DL (ref 8.3–10.1)
CALCIUM SERPL-MCNC: 8.7 MG/DL (ref 8.3–10.1)
CHLORIDE SERPL-SCNC: 107 MMOL/L (ref 96–108)
CO2 SERPL-SCNC: 24 MMOL/L (ref 21–32)
CREAT SERPL-MCNC: 0.61 MG/DL (ref 0.6–1.3)
EST. AVERAGE GLUCOSE BLD GHB EST-MCNC: 105 MG/DL
GFR SERPL CREATININE-BSD FRML MDRD: 114 ML/MIN/1.73SQ M
GLUCOSE SERPL-MCNC: 114 MG/DL (ref 65–140)
HBA1C MFR BLD: 5.3 %
POTASSIUM SERPL-SCNC: 4 MMOL/L (ref 3.5–5.3)
PROT SERPL-MCNC: 7 G/DL (ref 6.4–8.4)
SODIUM SERPL-SCNC: 134 MMOL/L (ref 135–147)

## 2023-02-13 RX ORDER — BLOOD SUGAR DIAGNOSTIC
STRIP MISCELLANEOUS
Qty: 100 STRIP | Refills: 7 | Status: SHIPPED | OUTPATIENT
Start: 2023-02-13

## 2023-02-13 RX ORDER — BLOOD-GLUCOSE METER
KIT MISCELLANEOUS
Qty: 1 KIT | Refills: 0 | Status: SHIPPED | OUTPATIENT
Start: 2023-02-13

## 2023-02-13 RX ORDER — LANCETS 33 GAUGE
EACH MISCELLANEOUS
Qty: 100 EACH | Refills: 7 | Status: SHIPPED | OUTPATIENT
Start: 2023-02-13

## 2023-02-13 NOTE — PATIENT INSTRUCTIONS
-Check A1c and CMP  -A1c goal is 5 6% or less  -Follow up with dietitian   -Keep 3 day food log to review with dietitian   -Start self monitoring blood glucose fasting and 2 hours after start of each meal  Keep glucose log  Glucose goals: fasting 60-95 mg/dL, 140 mg/dL or less 1 hour post meals, and 120 mg/dL or less 2 hours post meal    -Report glucose readings weekly via testbirdst every Monday   -Start GDM  calorie meal plan with 3 meals and 3 snacks including recommended combination of carb, protein and fat per meal/snack  Minimum total daily carbohydrates 175 grams a day paired with half in protein    -Please eat meal or snack every 2-3 5 hours while awake   -No more than 8 to 10 hours of fasting overnight   -Stay active if no restriction from your OB, walk up to 30 minutes a day  -Always have glucose available to treat hypoglycemia  Use 15:15 rule  Refer to hypoglycemia patient education sheet  Test blood sugar when experiencing signs and symptoms of hypoglycemia and prior to driving    -Fetal echo at 22-24 weeks gestation   -Fetal growth ultrasound every 4 to 6 weeks gestation    -Continue prenatal vitamin as recommended   -Start baby aspirin 162 mg daily as recommended   -At 36 weeks gestation, stop baby aspirin    -Continue follow-up with your OB and MFM as recommended   -Stay in close contact with diabetes education team   -Insulin requirements during pregnancy; basal/bolus concept and Metformin discussed  -Very important to maintain tight glucose control during pregnancy to decrease risk factors including fetal macrosomia; birth injury; risk of ; polyhydramnios; pre-term labor; pre-eclampsia;  hypoglycemia; jaundice and stillbirth  -Diabetes and pregnancy booklet; diet plan and hypoglycemia patient education

## 2023-02-13 NOTE — PROGRESS NOTES
Virtual Regular Visit    Verification of patient location:[place]    Patient is located in the following state in which I hold an active license [place]      Assessment/Plan:    Problem List Items Addressed This Visit        Endocrine    Gestational diabetes mellitus (GDM) in second trimester - Primary     -Check A1c and CMP  -A1c goal is 5 6% or less  -Follow up with dietitian   -Keep 3 day food log to review with dietitian   -Start self monitoring blood glucose fasting and 2 hours after start of each meal  Keep glucose log  Glucose goals: fasting 60-95 mg/dL, 140 mg/dL or less 1 hour post meals, and 120 mg/dL or less 2 hours post meal    -Report glucose readings weekly via [employer] every Monday   -Start GDM 2000 calorie meal plan with 3 meals and 3 snacks including recommended combination of carb, protein and fat per meal/snack  Minimum total daily carbohydrates 175 grams a day paired with half in protein    -Please eat meal or snack every 2-3 5 hours while awake   -No more than 8 to 10 hours of fasting overnight   -Stay active if no restriction from your OB, walk up to 30 minutes a day  -Always have glucose available to treat hypoglycemia  Use 15:15 rule  Refer to hypoglycemia patient education sheet  Test blood sugar when experiencing signs and symptoms of hypoglycemia and prior to driving    -Fetal echo at 22-24 weeks gestation   -Fetal growth ultrasound every 4 to 6 weeks gestation    -Continue prenatal vitamin as recommended   -Start baby aspirin 162 mg daily as recommended   -At 36 weeks gestation, stop baby aspirin    -Continue follow-up with your OB and MFM as recommended   -Stay in close contact with diabetes education team   -Insulin requirements during pregnancy; basal/bolus concept and Metformin discussed    -Very important to maintain tight glucose control during pregnancy to decrease risk factors including fetal macrosomia; birth injury; risk of ; polyhydramnios; pre-term labor; pre-eclampsia;  hypoglycemia; jaundice and stillbirth  -Diabetes and pregnancy booklet; diet plan and hypoglycemia patient education  No results found for: HGBA1C         Relevant Medications    Blood Glucose Monitoring Suppl (OneTouch Verio Reflect) w/Device KIT    Lancets (OneTouch Delica Plus [id number]) MISC    glucose blood (OneTouch Verio) test strip    Other Relevant Orders    Mychart glucose flowsheet    Hemoglobin A1C    Comprehensive metabolic panel       Other    14 weeks gestation of pregnancy    Relevant Medications    Blood Glucose Monitoring Suppl (OneTouch Verio Reflect) w/Device KIT    Lancets (OneTouch Delica Plus [id number]) MISC    glucose blood (OneTouch Verio) test strip    Other Relevant Orders    Mychart glucose flowsheet    Hemoglobin A1C    Comprehensive metabolic panel    Multigravida of advanced maternal age in second trimester    Obesity affecting pregnancy in second trimester     -First visit weight 222 lbs  -Current weight 224 lbs  -Recommended weight gain 11 to 20 lbs  -Start GDM and follow up with dietitian            Relevant Medications    Blood Glucose Monitoring Suppl (OneTouch Verio Reflect) w/Device KIT    Lancets (OneTouch Delica Plus [id number]) MISC    glucose blood (OneTouch Verio) test strip    Other Relevant Orders    Mychart glucose flowsheet    Hemoglobin A1C    Comprehensive metabolic panel    BMI 49 1-93 7,[facility]    Relevant Medications    Blood Glucose Monitoring Suppl (OneTouch Verio Reflect) w/Device KIT    Lancets (OneTouch Delica Plus [id number]) MISC    glucose blood (OneTouch Verio) test strip    Other Relevant Orders    Mychart glucose flowsheet    Hemoglobin A1C    Comprehensive metabolic panel            Reason for visit is   Chief Complaint   Patient presents with   • Virtual Regular Visit   • Gestational Diabetes   • Patient Education        Encounter provider Maryhelen Pallas, [facility]    Provider located at [facility] [address] [facility] [address]      Recent Visits  Date Type Provider Dept   02/10/23 Telephone Raquel Vázquez RN An    23 Telephone Alok Beasley An    Showing recent visits within past 7 days and meeting all other requirements  Today's Visits  Date Type Provider Dept   23 [id number], CRNP An    Showing today's visits and meeting all other requirements  Future Appointments  No visits were found meeting these conditions  Showing future appointments within next 150 days and meeting all other requirements       The patient was identified by name and date of birth  Francisco Brown was informed that this is a telemedicine visit and that the visit is being conducted through Telephone  My office door was closed  No one else was in the room  She acknowledged consent and understanding of privacy and security of the video platform  The patient has agreed to participate and understands they can discontinue the visit at any time  Patient is aware this is a billable service  Rimma Waldron is a 42 y o  female   14 6/7 weeks gestation early GDM  No history of GDM  Has a 7 yo and 24 yo sons  Currently does not exercise   with occasional short runs  Is familiar with glucose meters  Believes will have a difficult time with diet  Going on vacation to [facility] and returns 2023  Pending A1c and CMP  Encouraged to start baby aspirin as recommended             Past Medical History:   Diagnosis Date   • Gestational diabetes mellitus (GDM) in second trimester 2023   • Urinary tract infection with hematuria 2018       Past Surgical History:   Procedure Laterality Date   • ABDOMINOPLASTY     • CORRECTION HAMMER TOE Right    • TOE SURGERY Right        Current Outpatient Medications   Medication Sig Dispense Refill   • Blood Glucose Monitoring Suppl (OneTouch Verio Reflect) w/Device KIT Dispense 1 kit per insurance formulary  GDM  1 kit 0   • glucose blood (OneTouch Verio) test strip Test 4 times a day  GDM  100 strip 7   • Lancets (OneTouch Delica Plus [id number]) MISC Use 4 day  GDM  100 each 7   • prenatal vitamin (CLASSIC FORMULA) 27-0 8 mg TAKE 1 TABLET BY MOUTH EVERY DAY 30 tablet 6     No current facility-administered medications for this visit  No Known Allergies    Review of Systems   Constitutional: Positive for fatigue  Negative for fever  HENT: Negative for congestion, sore throat and trouble swallowing  Eyes: Negative for visual disturbance  Respiratory: Negative for cough and shortness of breath  Cardiovascular: Negative for chest pain, palpitations and leg swelling  Gastrointestinal: Positive for nausea and vomiting  Negative for constipation  Endocrine: Positive for polyuria  Negative for polydipsia and polyphagia  Genitourinary: Negative for vaginal bleeding  Neurological: Positive for headaches  Negative for numbness  Psychiatric/Behavioral: Negative for sleep disturbance         Video Exam    Vitals:    02/13/23 0850   Weight: 102 kg (224 lb)   Height: 5' 6" (1 676 m)       I spent 56 minutes with patient today in which greater than 50% of the time was spent in counseling/coordination of care regarding GDM diagnosis, plan of care; reviewing chart; etc

## 2023-02-13 NOTE — ASSESSMENT & PLAN NOTE
-Check A1c and CMP  -A1c goal is 5 6% or less  -Follow up with dietitian   -Keep 3 day food log to review with dietitian   -Start self monitoring blood glucose fasting and 2 hours after start of each meal  Keep glucose log  Glucose goals: fasting 60-95 mg/dL, 140 mg/dL or less 1 hour post meals, and 120 mg/dL or less 2 hours post meal    -Report glucose readings weekly via ibabyboxt every Monday   -Start GDM  calorie meal plan with 3 meals and 3 snacks including recommended combination of carb, protein and fat per meal/snack  Minimum total daily carbohydrates 175 grams a day paired with half in protein    -Please eat meal or snack every 2-3 5 hours while awake   -No more than 8 to 10 hours of fasting overnight   -Stay active if no restriction from your OB, walk up to 30 minutes a day  -Always have glucose available to treat hypoglycemia  Use 15:15 rule  Refer to hypoglycemia patient education sheet  Test blood sugar when experiencing signs and symptoms of hypoglycemia and prior to driving    -Fetal echo at 22-24 weeks gestation   -Fetal growth ultrasound every 4 to 6 weeks gestation    -Continue prenatal vitamin as recommended   -Start baby aspirin 162 mg daily as recommended   -At 36 weeks gestation, stop baby aspirin    -Continue follow-up with your OB and MFM as recommended   -Stay in close contact with diabetes education team   -Insulin requirements during pregnancy; basal/bolus concept and Metformin discussed  -Very important to maintain tight glucose control during pregnancy to decrease risk factors including fetal macrosomia; birth injury; risk of ; polyhydramnios; pre-term labor; pre-eclampsia;  hypoglycemia; jaundice and stillbirth  -Diabetes and pregnancy booklet; diet plan and hypoglycemia patient education      No results found for: HGBA1C

## 2023-02-13 NOTE — ASSESSMENT & PLAN NOTE
-First visit weight 222 lbs  -Current weight 224 lbs  -Recommended weight gain 11 to 20 lbs  -Start GDM and follow up with dietitian

## 2023-02-13 NOTE — TELEPHONE ENCOUNTER
PT called office on 2/10/23 inquiring on results of NIPS and gender  PT was informed at that time that gender was not investigated  Message was sent [employer]  Today I investigated the Node Management and noted that the results are in with gender  Called pt with name and  confirmation  PT provided with results, pt receptive to information and declines questions at this time

## 2023-02-28 ENCOUNTER — TELEMEDICINE (OUTPATIENT)
Facility: HOSPITAL | Age: 38
End: 2023-02-28

## 2023-02-28 DIAGNOSIS — O24.419 GESTATIONAL DIABETES MELLITUS (GDM) IN SECOND TRIMESTER, GESTATIONAL DIABETES METHOD OF CONTROL UNSPECIFIED: Primary | ICD-10-CM

## 2023-02-28 DIAGNOSIS — O99.212 OBESITY AFFECTING PREGNANCY IN SECOND TRIMESTER: ICD-10-CM

## 2023-02-28 DIAGNOSIS — Z3A.17 17 WEEKS GESTATION OF PREGNANCY: ICD-10-CM

## 2023-02-28 NOTE — PROGRESS NOTES
Virtual Regular Visit    Verification of patient location:    Patient is located in the following state in which I hold an active license [place]      Assessment/Plan:    Problem List Items Addressed This Visit        Endocrine    Gestational diabetes mellitus (GDM) in second trimester - Primary       Other    14 weeks gestation of pregnancy    Obesity affecting pregnancy in second trimester            Reason for visit is   Chief Complaint   Patient presents with   • Gestational Diabetes   • Patient Education   • Virtual Regular Visit        Encounter provider Marino Buenrostro    Provider located at [facility] [address]      Recent Visits  No visits were found meeting these conditions  Showing recent visits within past 7 days and meeting all other requirements  Today's Visits  Date Type Provider Dept   23 Telemedicine Marino Buenrostro An    Showing today's visits and meeting all other requirements  Future Appointments  No visits were found meeting these conditions  Showing future appointments within next 150 days and meeting all other requirements       The patient was identified by name and date of birth  Bebo Sam was informed that this is a telemedicine visit and that the visit is being conducted through the [address] [employer] platform  She agrees to proceed     My office door was closed  No one else was in the room  She acknowledged consent and understanding of privacy and security of the video platform  The patient has agreed to participate and understands they can discontinue the visit at any time  Patient is aware this is a billable service  Terence Olivares is a 42 y  o pregnant female          HPI     Past Medical History:   Diagnosis Date   • Gestational diabetes mellitus (GDM) in second trimester 2023   • Urinary tract infection with hematuria 2018       Past Surgical History:   Procedure Laterality Date   • ABDOMINOPLASTY     • CORRECTION HAMMER TOE Right    • TOE SURGERY Right 2005       Current Outpatient Medications   Medication Sig Dispense Refill   • Blood Glucose Monitoring Suppl (OneTouch Verio Reflect) w/Device KIT Dispense 1 kit per insurance formulary  GDM  1 kit 0   • glucose blood (OneTouch Verio) test strip Test 4 times a day  GDM  100 strip 7   • Lancets (OneTouch Delica Plus [id number]) MISC Use 4 day  GDM  100 each 7   • prenatal vitamin (CLASSIC FORMULA) 27-0 8 mg TAKE 1 TABLET BY MOUTH EVERY DAY 30 tablet 6     No current facility-administered medications for this visit  No Known Allergies    Review of Systems    Video Exam    There were no vitals filed for this visit  Physical Exam     I spent 65 minutes with patient today in which greater than 50% of the time was spent in counseling/coordination of care regarding gestational diabetes  There were multiple interruptions to today's virtual appointment  Patient was contacted by phone as a reminder and was able to continue with her appointment  Multiple interruptions due to phone calls and texts affecting connection of virtual visit        Demographics:  Language: English  Ethnicity: Black/   Country of Origin: Other [facility]  Highest grade completed: Do not know  Occupation: Unemployed    Personal & Family History:  Personal history of diabetes? no  Family members with diabetes: Mother, Carlean Re and aunt and uncle on maternal side  How many total pregnancies have you had? 4  How many children do you have? 2  Have you had a baby weighing larger than 9 lbs? no  Are you having swelling or fluid retention? no  Have you been placed on bedrest? no    Nutrition & Physical Activity:  Do you exercise? Walking; patient reported not having a car right now  Frequency of Exercise: Daily  How many meals do you eat daily?  Other meals vary; patient reported she usually has a smaller breakfast and no lunch and will eat a dinner meal  List times of meals: Other times vary and patient reported it depends on the day  How many snacks do you eat daily? Other anack times vary depending on if she is hungry   What type of diet are you following at home? Other patient reported some degreee of lactose intolerance  Do you have special Latter-day or ethnic dietary preferences? no  Do you have any food allergies or intolerances? yes patient reported sometimes when eating avocado and some types of fruit which vaires she experiences nausea   Do you receive WIC or food stamps? Yes both    Learning preferences: How do you learn best? Slides/ Power point  How do you rate your health? Good  Who is your primary support person? Self  How do you cope with stress? talk  How do you feel about being pregnant with diabetes? Not sure many questions        Thank you for referring your patient to [facility] Diabetes in Pregnancy [facility]      Julianna Carlos is a  42 y o  female who presents today for Class 2  Noted patient completed initial diabetes education on 2/13/22 with TORSTEN Will  Patient is at 17w0d gestation, Estimated Date of Delivery: 8/8/23  Reviewed and updated the following from patients medical record: PMH, Problem List, Allergies, and Current Medications      Visit Diagnosis:  GDM in pregnancy method of control unspecified    Additional Pregnancy Complications:  Obesity    Labs:    Lab Results   Component Value Date    [id number] 187 (H) 02/02/2023 2/13/23 A1c  5 3%    Medications:  No diabetes related medications    Anthropometrics:  Ht Readings from Last 3 Encounters:   02/13/23 5' 6" (1 676 m)   02/02/23 5' 6" (1 676 m)   02/02/23 5' 6" (1 676 m)     Wt Readings from Last 3 Encounters:   02/13/23 102 kg (224 lb)   02/02/23 102 kg (224 lb 3 2 oz)   02/02/23 101 kg (222 lb)     Pre-gravid weight: 222 pounds   Pre-gravid BMI: 35 8  Weight Change: +2 pounds  Weight gain recommendations: BMI (> 30) 11-20 lbs    Recent Ultra Sound Results:  Date:23 Nuchal translucency   Next US date: 3/9/23 early anatomy 3/21 detailed    Blood Glucose Monitoring:  Reinforcement of blood glucose goals and reporting guidelines  Glucose Meter: OneTouch Verio Flex  Testing blood sugars: 4 x per day (Fasting, 2 hour after start of each meal)  Method of reporting blood sugars:Glucose Flowsheet    Report blood sugar results weekly via:  Phone: [phone]   OR  My Chart (Message with image attachment, or Glucose Flowsheet)    Goal Blood Sugar Ranges:   Fastin-90 mg/dL  1 hour after the start of each meal: 140 mg/dL or less  2 hours after start of each meal: 120 mg/dL or less      BG Log:  Review of blood glucose log  Patient reported she had just returned from [facility] and started checking blood sugars yesterday  Date Fasting Post-  breakfast Post-  lunch Post-  dinner Before bedtime Carbs Comments   23 90 128 100 147      23 94                                                             Patient reported testing 2 hr pp from the end of the meal  Fasting blood glucose today was not a true fasting measurement  Meal Plan:  Calories: 2200 calorie (UID:[id number]) (PRO: 3-2-3/4-2-3/4-2) to 2400 calories    Diet Type: Regular  Additional Nutrition concerns: some degree of lactose intolerance and some nausea experienced with vomiting after eating fruit  This does not always happen and it is not always the same fruit  Diet review: Patient has made some positive changes with diet  She has tried to stop drinking soda and fruit juice  She often dines out for lunch meal       Diet Instruction:  The patient was instructed on the followin  Individualized meal plan  2  Importance of consistent carbohydrate intake via 3 meals and 3 snacks per day   3  Importance of protein as it relates to blood glucose control     4  Encouraged  patient to eat every 2 0-3 5 hours while awake  5  Encouraged patient to go no longer than 8-10 hours fasting overnight until first meal of the day  6  Provided suggested meal/snack options to increase nutrition and maintain consistent meal and snack intakes  Physical Activity:  Discussed benefits of physical activity to optimize blood glucose control, encouraged activity at patient is physically able  Always consult a physician prior to starting an exercise program  Recommend 20-30 minutes daily  Is patient physically active? Yes walking daily because she has no car at the current time  Sick day Guidelines:   Patient advised that sickness will raise blood sugar and need to continue medication regimen as directed  If blood sugar is > 160 mg/dL twice in one day call doctor  Instructed on what to do when unable to consume normal meal plan  Hypoglycemia & Treatment Guidelines:  Reviewed what hypoglycemia is, signs and symptoms, and how to treat via the 15:15 rule  Post-Partum Guidelines:  Completion of 75 gm CHO 2 hr gtt at 6 weeks post-partum to check for Type 2 DM diagnosis    Breastfeeding Guidelines:  Continue GDM meal plan plus additional 350-500 calories daily  Stay hydrated by drinking 8-10 (8 oz ) fluids daily  Examples of protein and carbohydrate snacks provided  Dining Out & Travel Guidelines:  Patient advised to be prepared with extra diabetes supplies, medications, and snacks, as well as sticking to the same time schedule and portions eaten at home for meals and snacks  Maternal-Fetal Testing:    Ultrasounds- growth scans every 4 weeks  NST- twice weekly starting at 32nd week GA   RAFAT- weekly starting at 32 weeks GA    Medication options were discussed  Patient Stated Goal: "I will eat 3 meals and 3 snacks each day, including protein at each"  Goal Assessment: Not on track patient finds this the most challenging  Suggestions were provided to assist in following the meal plan      Diabetes Self Management Support Plan outside of ongoing care: Other self    Learner/s Present:Learners Present: Patient   Barriers to Learning/Change: Financial  Expected Compliance: fair to good  Patient reported since returning from vacation she has been trying to follow the meal plan  Date to report blood sugars: requested patient to report blood sugars weekly every Tuesday via flow sheet  Follow up date: 4/25/23    Begin Time: 8:18 am  End Time: 9:35 AM    It was a pleasure working with them today  Please feel free to call with any questions or concerns      Blanca Brandon RD,LDN,CDE  Diabetes Educator  [facility]  Diabetes in Pregnancy Program  [address] [facility], [address] [place] [address],[address]  [address]

## 2023-03-02 ENCOUNTER — TELEPHONE (OUTPATIENT)
Dept: OBGYN CLINIC | Facility: CLINIC | Age: 38
End: 2023-03-02

## 2023-03-02 NOTE — TELEPHONE ENCOUNTER
Patient called to ask what kind of over the counter congestion medication is safe for her to take   She is 17 2 weeks today

## 2023-03-07 ENCOUNTER — ROUTINE PRENATAL (OUTPATIENT)
Dept: OBGYN CLINIC | Facility: CLINIC | Age: 38
End: 2023-03-07

## 2023-03-07 ENCOUNTER — HOSPITAL ENCOUNTER (OUTPATIENT)
Facility: HOSPITAL | Age: 38
Setting detail: OBSERVATION
Discharge: HOME | End: 2023-03-08
Attending: OBSTETRICS & GYNECOLOGY | Admitting: STUDENT IN AN ORGANIZED HEALTH CARE EDUCATION/TRAINING PROGRAM

## 2023-03-07 VITALS
BODY MASS INDEX: 35.84 KG/M2 | RESPIRATION RATE: 18 BRPM | HEIGHT: 66 IN | HEART RATE: 99 BPM | WEIGHT: 223 LBS | DIASTOLIC BLOOD PRESSURE: 77 MMHG | SYSTOLIC BLOOD PRESSURE: 124 MMHG

## 2023-03-07 DIAGNOSIS — Z3A.18 18 WEEKS GESTATION OF PREGNANCY: Primary | ICD-10-CM

## 2023-03-07 DIAGNOSIS — O42.919 PRETERM PREMATURE RUPTURE OF MEMBRANES (PPROM) WITH UNKNOWN ONSET OF LABOR: Primary | ICD-10-CM

## 2023-03-07 DIAGNOSIS — O23.40 UTI IN PREGNANCY: ICD-10-CM

## 2023-03-07 DIAGNOSIS — Z3A.18 18 WEEKS GESTATION OF PREGNANCY: ICD-10-CM

## 2023-03-07 DIAGNOSIS — O36.80X0 ENCOUNTER TO DETERMINE FETAL VIABILITY OF PREGNANCY, SINGLE OR UNSPECIFIED FETUS: ICD-10-CM

## 2023-03-07 LAB
A1 MICROGLOB PLACENTAL VAG QL: POSITIVE
ABO GROUP BLD: NORMAL
ALBUMIN SERPL BCP-MCNC: 3.5 G/DL (ref 3.5–5)
ALP SERPL-CCNC: 100 U/L (ref 34–104)
ALT SERPL W P-5'-P-CCNC: 12 U/L (ref 7–52)
ANION GAP SERPL CALCULATED.3IONS-SCNC: 6 MMOL/L (ref 4–13)
AST SERPL W P-5'-P-CCNC: 13 U/L (ref 13–39)
BACTERIA UR QL AUTO: ABNORMAL /HPF
BASOPHILS # BLD AUTO: 0.02 THOUSANDS/ÂΜL (ref 0–0.1)
BASOPHILS NFR BLD AUTO: 0 % (ref 0–1)
BILIRUB SERPL-MCNC: 0.26 MG/DL (ref 0.2–1)
BILIRUB UR QL STRIP: NEGATIVE
BLD GP AB SCN SERPL QL: NEGATIVE
BUN SERPL-MCNC: 8 MG/DL (ref 5–25)
BV WHIFF TEST VAG QL: ABNORMAL
CALCIUM SERPL-MCNC: 9.5 MG/DL (ref 8.4–10.2)
CAOX CRY URNS QL MICRO: ABNORMAL /HPF
CHLORIDE SERPL-SCNC: 105 MMOL/L (ref 96–108)
CLARITY UR: ABNORMAL
CO2 SERPL-SCNC: 25 MMOL/L (ref 21–32)
COLOR UR: YELLOW
CREAT SERPL-MCNC: 0.54 MG/DL (ref 0.6–1.3)
EOSINOPHIL # BLD AUTO: 0.14 THOUSAND/ÂΜL (ref 0–0.61)
EOSINOPHIL NFR BLD AUTO: 1 % (ref 0–6)
ERYTHROCYTE [DISTWIDTH] IN BLOOD BY AUTOMATED COUNT: 13 % (ref 11.6–15.1)
FERN TEST: NEGATIVE
GFR SERPL CREATININE-BSD FRML MDRD: 119 ML/MIN/1.73SQ M
GLUCOSE SERPL-MCNC: 86 MG/DL (ref 65–140)
GLUCOSE UR STRIP-MCNC: NEGATIVE MG/DL
HCT VFR BLD AUTO: 36.2 % (ref 34.8–46.1)
HGB BLD-MCNC: 12.1 G/DL (ref 11.5–15.4)
HGB UR QL STRIP.AUTO: NEGATIVE
IMM GRANULOCYTES # BLD AUTO: 0.09 THOUSAND/UL (ref 0–0.2)
IMM GRANULOCYTES NFR BLD AUTO: 1 % (ref 0–2)
KETONES UR STRIP-MCNC: NEGATIVE MG/DL
LEUKOCYTE ESTERASE UR QL STRIP: NEGATIVE
LYMPHOCYTES # BLD AUTO: 1.98 THOUSANDS/ÂΜL (ref 0.6–4.47)
LYMPHOCYTES NFR BLD AUTO: 20 % (ref 14–44)
MCH RBC QN AUTO: 30.5 PG (ref 26.8–34.3)
MCHC RBC AUTO-ENTMCNC: 33.4 G/DL (ref 31.4–37.4)
MCV RBC AUTO: 91 FL (ref 82–98)
MONOCYTES # BLD AUTO: 0.72 THOUSAND/ÂΜL (ref 0.17–1.22)
MONOCYTES NFR BLD AUTO: 7 % (ref 4–12)
MUCOUS THREADS UR QL AUTO: ABNORMAL
NEUTROPHILS # BLD AUTO: 7.1 THOUSANDS/ÂΜL (ref 1.85–7.62)
NEUTS SEG NFR BLD AUTO: 71 % (ref 43–75)
NITRAZINE PAPER TEST: POSITIVE
NITRITE UR QL STRIP: POSITIVE
NON-SQ EPI CELLS URNS QL MICRO: ABNORMAL /HPF
NRBC BLD AUTO-RTO: 0 /100 WBCS
PH SMN: 5 [PH]
PH UR STRIP.AUTO: 6.5 [PH]
PLATELET # BLD AUTO: 296 THOUSANDS/UL (ref 149–390)
PMV BLD AUTO: 9.1 FL (ref 8.9–12.7)
POTASSIUM SERPL-SCNC: 3.6 MMOL/L (ref 3.5–5.3)
PROT SERPL-MCNC: 6.9 G/DL (ref 6.4–8.4)
PROT UR STRIP-MCNC: ABNORMAL MG/DL
RBC # BLD AUTO: 3.97 MILLION/UL (ref 3.81–5.12)
RBC #/AREA URNS AUTO: ABNORMAL /HPF
RH BLD: POSITIVE
SODIUM SERPL-SCNC: 136 MMOL/L (ref 135–147)
SP GR UR STRIP.AUTO: 1.03 (ref 1–1.03)
SPECIMEN EXPIRATION DATE: NORMAL
UROBILINOGEN UR STRIP-ACNC: <2 MG/DL
WBC # BLD AUTO: 10.05 THOUSAND/UL (ref 4.31–10.16)
WBC #/AREA URNS AUTO: ABNORMAL /HPF

## 2023-03-07 RX ORDER — FLUTICASONE PROPIONATE 50 MCG
1 SPRAY, SUSPENSION (ML) NASAL DAILY
Status: DISCONTINUED | OUTPATIENT
Start: 2023-03-08 | End: 2023-03-08 | Stop reason: HOSPADM

## 2023-03-07 RX ORDER — ACETAMINOPHEN 325 MG/1
650 TABLET ORAL EVERY 4 HOURS PRN
Status: DISCONTINUED | OUTPATIENT
Start: 2023-03-07 | End: 2023-03-08 | Stop reason: HOSPADM

## 2023-03-07 RX ORDER — DOCUSATE SODIUM 100 MG/1
100 CAPSULE, LIQUID FILLED ORAL 2 TIMES DAILY
Status: DISCONTINUED | OUTPATIENT
Start: 2023-03-07 | End: 2023-03-08 | Stop reason: HOSPADM

## 2023-03-07 RX ORDER — PRENATAL VIT/IRON FUM/FOLIC AC 27MG-0.8MG
TABLET ORAL
Qty: 90 TABLET | Refills: 3 | Status: SHIPPED | OUTPATIENT
Start: 2023-03-07

## 2023-03-07 RX ORDER — ONDANSETRON 2 MG/ML
4 INJECTION INTRAMUSCULAR; INTRAVENOUS EVERY 8 HOURS PRN
Status: DISCONTINUED | OUTPATIENT
Start: 2023-03-07 | End: 2023-03-08 | Stop reason: HOSPADM

## 2023-03-07 RX ORDER — CALCIUM CARBONATE 200(500)MG
1000 TABLET,CHEWABLE ORAL DAILY PRN
Status: DISCONTINUED | OUTPATIENT
Start: 2023-03-07 | End: 2023-03-08 | Stop reason: HOSPADM

## 2023-03-07 RX ORDER — NITROFURANTOIN 25; 75 MG/1; MG/1
100 CAPSULE ORAL 2 TIMES DAILY WITH MEALS
Status: DISCONTINUED | OUTPATIENT
Start: 2023-03-07 | End: 2023-03-08 | Stop reason: HOSPADM

## 2023-03-07 NOTE — PROGRESS NOTES
[address]  [facility]  OB/GYN prenatal visit    S: 42 y o  [id number] 18w0d here for PN visit but has a problem  Pt reports she woke up from a nap and felt wet, and when she got up had a gush of clear fluid She denies pelvic pain or cramps  O:  Vitals:    03/07/23 1412   BP: 124/77   Pulse: 99   Resp: 18       Gen: no acute distress, nonlabored breathing  Fundal Height (cm): 21 cm  Fetal Heart Rate: 157    A/P:      IUP at 18w0d  SSE no vaginal pool but small amount clear/ white watery dsch when speculum was withdrawn  cx appears closed  Nitrazine positive  Fern negative  Will send to triage for evaluation and Amnisure test    L&D notified         TORSTEN Ferrari  3/7/2023  2:38 PM

## 2023-03-08 VITALS
OXYGEN SATURATION: 99 % | SYSTOLIC BLOOD PRESSURE: 120 MMHG | HEIGHT: 66 IN | BODY MASS INDEX: 35.84 KG/M2 | RESPIRATION RATE: 18 BRPM | WEIGHT: 223 LBS | TEMPERATURE: 98 F | HEART RATE: 99 BPM | DIASTOLIC BLOOD PRESSURE: 69 MMHG

## 2023-03-08 PROBLEM — O23.40 UTI IN PREGNANCY: Status: ACTIVE | Noted: 2023-03-08

## 2023-03-08 LAB
C TRACH DNA SPEC QL NAA+PROBE: NEGATIVE
GLUCOSE SERPL-MCNC: 85 MG/DL (ref 65–140)
GLUCOSE SERPL-MCNC: 98 MG/DL (ref 65–140)
N GONORRHOEA DNA SPEC QL NAA+PROBE: NEGATIVE
TREPONEMA PALLIDUM IGG+IGM AB [PRESENCE] IN SERUM OR PLASMA BY IMMUNOASSAY: NORMAL

## 2023-03-08 RX ORDER — CALCIUM CARBONATE 200(500)MG
1000 TABLET,CHEWABLE ORAL DAILY PRN
Refills: 0
Start: 2023-03-08

## 2023-03-08 RX ORDER — NITROFURANTOIN 25; 75 MG/1; MG/1
100 CAPSULE ORAL 2 TIMES DAILY WITH MEALS
Qty: 10 CAPSULE | Refills: 0 | Status: SHIPPED | OUTPATIENT
Start: 2023-03-08 | End: 2023-03-13

## 2023-03-08 RX ORDER — FLUTICASONE PROPIONATE 50 MCG
1 SPRAY, SUSPENSION (ML) NASAL DAILY
Qty: 9.9 ML | Refills: 0
Start: 2023-03-09

## 2023-03-08 RX ORDER — ACETAMINOPHEN 325 MG/1
650 TABLET ORAL EVERY 4 HOURS PRN
Refills: 0
Start: 2023-03-08

## 2023-03-08 RX ORDER — DOCUSATE SODIUM 100 MG/1
100 CAPSULE, LIQUID FILLED ORAL 2 TIMES DAILY
Refills: 0
Start: 2023-03-08

## 2023-03-08 RX ADMIN — FLUTICASONE PROPIONATE 1 SPRAY: 50 SPRAY, METERED NASAL at 01:42

## 2023-03-08 RX ADMIN — NITROFURANTOIN MONOHYDRATE AND NITROFURANTOIN MACROCRYSTALLINE 100 MG: 75; 25 CAPSULE ORAL at 01:42

## 2023-03-08 RX ADMIN — NITROFURANTOIN MONOHYDRATE AND NITROFURANTOIN MACROCRYSTALLINE 100 MG: 75; 25 CAPSULE ORAL at 18:42

## 2023-03-08 RX ADMIN — NITROFURANTOIN MONOHYDRATE AND NITROFURANTOIN MACROCRYSTALLINE 100 MG: 75; 25 CAPSULE ORAL at 08:10

## 2023-03-08 RX ADMIN — FLUTICASONE PROPIONATE 1 SPRAY: 50 SPRAY, METERED NASAL at 08:11

## 2023-03-08 NOTE — ASSESSMENT & PLAN NOTE
Rupture of membranes on 3/7 at 1300   Nitrazine positive, minimal pooling, positive amnisure  No appreciable fluid on TAUS   She is afebrile, VSS   No leukocytosis noted   GC/CT pending , no other infections noted in the vagina   Cervix is 2cm 60% effaced and high by SVE   Discussed with Rogerio Monge and her partner the diagnosis of previable PPROM and options at this gestational age  They are aware that at previable gestation, there are no resuscitative efforts that could be performed on baby in the event of delivery  The ability to provide resuscitative efforts would be readdressed in the 22nd week  Would not offer latency antibiotics, betamethasone or magnesium at this time due to gestational age  She and her partner are aware that about half of women will go into spontaneous labor within 1 week  She is at risk of infection and in the event that infection occurs that would be a contraindication to continuing the pregnancy as infection could lead to sepsis which poses significant risk the mother  Further, we discussed other risks including risk of placental abruption leading to significant hemorrhage and retained placenta  We briefly discussed option of termination of pregnancy and Rogerio Teixeirauser is not interested  When she is discharged to home, she should monitor her symptoms closely- fever, chills, abdominal pain, nausea, vomiting, foul discharge and vaginal bleeding  If any of those should occur she should come in for evaluation  lena and her  verbalized undestanding       Afebrile, no tachycardia, WBC 10

## 2023-03-08 NOTE — PLAN OF CARE
Problem: ANTEPARTUM  Goal: Maintain pregnancy as long as maternal and/or fetal condition is stable  Description: INTERVENTIONS:  - Maternal surveillance  - Fetal surveillance  - Monitor uterine activity  - Medications as ordered  - Bedrest  Outcome: Progressing     Problem: Knowledge Deficit  Goal: Patient/family/caregiver demonstrates understanding of disease process, treatment plan, medications, and discharge instructions  Description: Complete learning assessment and assess knowledge base    Interventions:  - Provide teaching at level of understanding  - Provide teaching via preferred learning methods  Outcome: Progressing     Problem: DISCHARGE PLANNING  Goal: Discharge to home or other facility with appropriate resources  Description: INTERVENTIONS:  - Identify barriers to discharge w/patient and caregiver  - Arrange for needed discharge resources and transportation as appropriate  - Identify discharge learning needs (meds, wound care, etc )  - Arrange for interpretive services to assist at discharge as needed  - Refer to Case Management Department for coordinating discharge planning if the patient needs post-hospital services based on physician/advanced practitioner order or complex needs related to functional status, cognitive ability, or social support system  Outcome: Progressing     Problem: GENITOURINARY - ADULT  Goal: Maintains or returns to baseline urinary function  Description: INTERVENTIONS:  - Assess urinary function  - Encourage oral fluids to ensure adequate hydration if ordered  - Administer IV fluids as ordered to ensure adequate hydration  - Administer ordered medications as needed  - Offer frequent toileting  - Follow urinary retention protocol if ordered  Outcome: Progressing     Problem: DECISION MAKING  Goal: Pt/Family able to effectively weigh alternatives and participate in decision making related to treatment and care  Description: INTERVENTIONS:  - Identify decision maker  - Determine when there are differences among patient's view, family's view, and healthcare provider's view of patient condition and care goals  - Facilitate patient/family articulation of goals for care  - Help patient/family identify pros/cons of alternative solutions  - Provide information as requested by patient/family  - Respect patient/family rights related to privacy and sharing information   - Serve as a liaison between patient, family and health care team  - Initiate consults as appropriate (Ethics Team, Palliative Care, Family Care Conference, etc )  Outcome: Progressing     Problem: SPIRITUAL CARE  Goal: Pt/Family able to move forward in process of forgiving self, others and/or higher power  Description: INTERVENTIONS:  - Assist patient with any spiritual needs/requests such as communion, confession, anointing, etc  - Explore guilt and help patient/family identify possible spiritual/cultural beliefs and values  - Explore possibilities of making amends & reconciliation with self, others, and/or a greater power  - Guide patient/family in identifying painful feelings  - Help patient explore and identify spiritual beliefs, cultural understandings or values that may help or hinder letting go of issue  - Help patient explore feelings of anger, bitterness, resentment, anxiety   Help patient/family identify and examine the situation in which these feelings are experienced  - Help patient/family identify destructive displacement of feelings onto other individuals  - Refer patient to formal counseling and/or to k[facility] community for further support as needed or per request  Outcome: Progressing  Goal: Patient feels balance and connection with others and/or higher power that empowers the self during times of loss, guilt and fear  Description: INTERVENTIONS:  - Create safety for patient through empathic presence and non-judgmental listening  - Encourage patient to explore his/her values, beliefs and/or spiritual images and practices  - Encourage use of breath work, imagery, meditation, relaxation, reiki to ease distress and provide healing  - Encourage use of cultural and spiritual celebrations and rituals  - Facilitate discussion that helps patient sort out spiritual concerns  - Help patient identify where meaning/hope/comfort & strength are in his/her life  - Refer patient to kim community for assistance, as appropriate  - Respond to patient/family need for prayer/ritual/sacrament/ceremony  Outcome: Progressing

## 2023-03-08 NOTE — PROGRESS NOTES
Afternoon Assessment    Patient reports feeling physically well  She denies fevers/chills, chest pain/SOB, or foul-smelling discharge  No large gushes of fluid, no vaginal bleeding, no contractions  Patient again expresses desire for expectant management at this time  After discussion with MFM and NICU, plans to return to the hospital at 23 weeks for admission in the setting of PPROM  Discussed that outpatient antibiotics can be offered at 20w; recommend azithromycin and amoxicillin PO  All questions answered at this time  Return precautions reviewed; encouraged patient to present if she ever feels chills/fevers, or has foul-smelling, purulent discharge      D/w Dr Marino Moore, PGY3

## 2023-03-08 NOTE — UTILIZATION REVIEW
Initial Clinical Review    Admission: Date/Time/Statement:   Admission Orders (From admission, onward)     Ordered        03/07/23 2034  Place in Observation  Once                      Orders Placed This Encounter   Procedures   • Place in Observation     Standing Status:   Standing     Number of Occurrences:   1     Order Specific Question:   Level of Care     Answer:   Med Surg [16]     ED Arrival Information     Expected   3/7/2023     Arrival   3/7/2023 17:48    Acuity   -            Means of arrival   Walk-In    Escorted by   Self    Service   OB/GYN    Admission type   Emergency            Arrival complaint   VAGINAL DISCHARGE (18 WKS PREGNANT)           Chief Complaint   Patient presents with   • Vaginal Discharge   • vaginal discharge     @ 1300 today       Initial Presentation: 42 y o  female  [id number] at 18w0d presented to L&D as observation for PPROM @ 18 weeks gestation  Reported ROM time :3/7 at 1300   Speculum : Scant  Clear fluid in vaginal vault,Nitrazine positive, Wet mt/KOH negative Positive amnisure She reports that the fluid had no odor and was clear   Plan consult MFM FBS & 2 hr PP GDM  SVE: 2/60%/-4   FHT: 157bpm    OB Triage Vitals   Temperature Pulse Respirations Blood Pressure SpO2   03/07/23 1738 03/07/23 1738 03/07/23 1738 03/07/23 1738 03/07/23 1738   98 6 °F (37 °C) 97 18 135/73 99 %      Temp Source Heart Rate Source Patient Position - Orthostatic VS BP Location FiO2 (%)   03/07/23 1738 03/07/23 1738 03/07/23 1804 03/07/23 1738 --   Oral Monitor Lying Right arm       Pain Score       03/07/23 1804       No Pain          Wt Readings from Last 1 Encounters:   03/07/23 101 kg (223 lb)     Additional Vital Signs:   Date/Time Temp Pulse Resp BP MAP (mmHg) SpO2 O2 Device Cardiac (WDL) Patient Position - Orthostatic VS   03/08/23 0325 98 3 °F (36 8 °C) 93 16 126/71 -- 99 % None (Room air) -- Lying   03/08/23 0100 -- -- -- -- -- -- -- WDL --   03/07/23 1811 -- 85 -- 122/75 -- 99 % -- -- -- 03/07/23 1804 98 7 °F (37 1 °C) 98 18 122/75 -- 96 % -- -- Lying     Pertinent Labs/Diagnostic Test Results:     Results from last 7 days   Lab Units 03/07/23 2117   WBC Thousand/uL 10 05   HEMOGLOBIN g/dL 12 1   HEMATOCRIT % 36 2   PLATELETS Thousands/uL 296   NEUTROS ABS Thousands/µL 7 10         Results from last 7 days   Lab Units 03/07/23 2117   SODIUM mmol/L 136   POTASSIUM mmol/L 3 6   CHLORIDE mmol/L 105   CO2 mmol/L 25   ANION GAP mmol/L 6   BUN mg/dL 8   CREATININE mg/dL 0 54*   EGFR ml/min/1 73sq m 119   CALCIUM mg/dL 9 5     Results from last 7 days   Lab Units 03/07/23 2117   AST U/L 13   ALT U/L 12   ALK PHOS U/L 100   TOTAL PROTEIN g/dL 6 9   ALBUMIN g/dL 3 5   TOTAL BILIRUBIN mg/dL 0 26         Results from last 7 days   Lab Units 03/07/23 2117   GLUCOSE RANDOM mg/dL 86       Results from last 7 days   Lab Units 03/07/23 2119   CLARITY UA  Turbid   COLOR UA  Yellow   SPEC GRAV UA  1 028   PH UA  6 5   GLUCOSE UA mg/dl Negative   KETONES UA mg/dl Negative   BLOOD UA  Negative   PROTEIN UA mg/dl Trace*   NITRITE UA  Positive*   BILIRUBIN UA  Negative   UROBILINOGEN UA (BE) mg/dl <2 0   LEUKOCYTES UA  Negative   WBC UA /hpf 4-10*   RBC UA /hpf 1-2   BACTERIA UA /hpf Innumerable*   EPITHELIAL CELLS WET PREP /hpf Occasional   MUCUS THREADS  Occasional*       OB Triage  Treatment:   Medication Administration from 03/07/2023 1651 to 03/08/2023 0745       Date/Time Order Dose Route Action     03/08/2023 0142 EST nitrofurantoin (MACROBID) extended-release capsule 100 mg 100 mg Oral Given During Downtime     03/08/2023 0142 EST fluticasone (FLONASE) 50 mcg/act nasal spray 1 spray 1 spray Each Nare Given During Downtime        Past Medical History:   Diagnosis Date   • Gestational diabetes mellitus (GDM) in second trimester 2/13/2023   • Urinary tract infection with hematuria 11/5/2018     Present on Admission:  • Gestational diabetes mellitus (GDM) in second trimester      Admitting Diagnosis: 18 weeks gestation of pregnancy [Z3A 18]  Clear vaginal discharge [N89 8]  Age/Sex: 42 y o  female     Admission Orders:  FBS & 2 hr PP       Scheduled Medications:  docusate sodium, 100 mg, Oral, BID  fluticasone, 1 spray, Each Nare, Daily  nitrofurantoin, 100 mg, Oral, BID With Meals      Continuous IV Infusions:     PRN Meds:  acetaminophen, 650 mg, Oral, Q4H PRN  calcium carbonate, 1,000 mg, Oral, Daily PRN  ondansetron, 4 mg, Intravenous, Q8H PRN        IP CONSULT TO PERINATOLOGY    Network Utilization Review Department  ATTENTION: Please call with any questions or concerns to [phone] and carefully listen to the prompts so that you are directed to the right person  All voicemails are confidential   Rigo Brain all requests for admission clinical reviews, approved or denied determinations and any other requests to dedicated fax number below belonging to the campus where the patient is receiving treatment   List of dedicated fax numbers for the Facilities:  [address] DENIALS (Administrative/Medical Necessity) [phone]   [address] (Maternity/NICU/Pediatrics) [phone]   7 Ani Maldonado [phone]   [place] [address] [phone]   [address] [place] [address] [address] [phone]   1553 [facility] 815 [facility] [phone]

## 2023-03-08 NOTE — ASSESSMENT & PLAN NOTE
70.8 NIPS negative   Negative nuchal translucency   MSAFP not done    by TAUS   On LD ASA   Fetus in transverse presentation  Ultrasound to be completed today

## 2023-03-08 NOTE — PROGRESS NOTES
Progress Note - Maternal Fetal Medicine   Claudio Valdivia 42 y o  female MRN: [id number]  Unit/Bed#: -01 Encounter: [id number]  Admit date: 3/7/2023  Today's date: 23    Assessment/Plan     Ms Jillian Horta is a 42 y o  [id number] at [facility] 48 Day: 2, admitted with PPROM  By issue:    *  premature rupture of membranes, antepartum  Assessment & Plan  Rupture of membranes on 3/7 at 1300   Nitrazine positive, minimal pooling, positive amnisure  No appreciable fluid on TAUS   She is afebrile, VSS   No leukocytosis noted   GC/CT pending , no other infections noted in the vagina   Cervix is 2cm 60% effaced and high by SVE   Discussed with Anthony Brooke and her partner the diagnosis of previable PPROM and options at this gestational age  They are aware that at previable gestation, there are no resuscitative efforts that could be performed on baby in the event of delivery  The ability to provide resuscitative efforts would be readdressed in the 22nd week  Would not offer latency antibiotics, betamethasone or magnesium at this time due to gestational age  She and her partner are aware that about half of women will go into spontaneous labor within 1 week  She is at risk of infection and in the event that infection occurs that would be a contraindication to continuing the pregnancy as infection could lead to sepsis which poses significant risk the mother  Further, we discussed other risks including risk of placental abruption leading to significant hemorrhage and retained placenta  We briefly discussed option of termination of pregnancy and Anthony Brooke is not interested  When she is discharged to home, she should monitor her symptoms closely- fever, chills, abdominal pain, nausea, vomiting, foul discharge and vaginal bleeding  If any of those should occur she should come in for evaluation  lena and her  verbalized undestanding       18 weeks gestation of pregnancy  Assessment & Plan  NIPS negative Negative nuchal translucency   MSAFP not done    by NURY   On LD ASA   Fetus in transverse presentation  Ultrasound to be completed today     UTI in pregnancy  Assessment & Plan  UA positive for nitrites   Urine culture pending   Macrobid 100mg BID   Follow up culture and sensitivities     Gestational diabetes mellitus (GDM) in second trimester  Assessment & Plan  Lab Results   Component Value Date    HGBA1C 5 3 02/13/2023       No results for input(s): POCGLU in the last 72 hours  Blood Sugar Average: Last 72 hrs:     Diabetic diet   POCT fingersticks fasting and 2h post prandial            Subjective    Contractions: no  Loss of fluid: no; patient denies passing any fluid  Vaginal bleeding: no  Fetal movement: no    Pain: no  Headaches: no  Visual changes: no  Chest pain: no  Shortness of breath: no  Nausea: no  Vomiting/Diarrhea: no  Dysuria: no  Leg pain: no          Objective      Patient Vitals for the past 24 hrs:   BP Temp Temp src Pulse Resp SpO2 Height Weight   03/08/23 0325 126/71 98 3 °F (36 8 °C) Oral 93 16 99 % -- --   03/07/23 1811 122/75 -- -- 85 -- 99 % -- --   03/07/23 1804 122/75 98 7 °F (37 1 °C) Oral 98 18 96 % 5' 6" (1 676 m) 101 kg (223 lb)   03/07/23 1738 135/73 98 6 °F (37 °C) Oral 97 18 99 % -- --       Physical Exam  Constitutional:       Appearance: Normal appearance  Cardiovascular:      Rate and Rhythm: Normal rate and regular rhythm  Heart sounds: Normal heart sounds  No murmur heard  No friction rub  No gallop  Pulmonary:      Effort: Pulmonary effort is normal  No respiratory distress  Breath sounds: Normal breath sounds  No stridor  No wheezing, rhonchi or rales  Abdominal:      General: There is no distension  Palpations: Abdomen is soft  There is no mass  Tenderness: There is no abdominal tenderness  There is no guarding  Comments: Gravid, fundus below umbilicus   Musculoskeletal:         General: No swelling or tenderness     Skin: Findings: No rash  Neurological:      Mental Status: She is alert             Invasive Devices     Peripheral Intravenous Line  Duration           Peripheral IV 23 Left Antecubital <1 day                Results from last 7 days   Lab Units 23  2117   WBC Thousand/uL 10 05   NEUTROS ABS Thousands/µL 7 10   HEMOGLOBIN g/dL 12 1   MCV fL 91   PLATELETS Thousands/uL 296     Results from last 7 days   Lab Units 23  211   POTASSIUM mmol/L 3 6   CHLORIDE mmol/L 105   CO2 mmol/L 25   BUN mg/dL 8   CREATININE mg/dL 0 54*   EGFR ml/min/1 73sq m 119     Results from last 7 days   Lab Units 23  211   AST U/L 13   ALT U/L 12     Results from last 7 days   Lab Units 23  2117   PLATELETS Thousands/uL 296                           Brief review of pertinent history:  Past Medical History:   Diagnosis Date   • Gestational diabetes mellitus (GDM) in second trimester 2023   • Urinary tract infection with hematuria 2018     Past Surgical History:   Procedure Laterality Date   • ABDOMINOPLASTY     • CORRECTION HAMMER TOE Right    • TOE SURGERY Right      OB History    Para Term  AB Living   4 2 2   1 2   SAB IAB Ectopic Multiple Live Births   1       2      # Outcome Date GA Lbr Lambert/2nd Weight Sex Delivery Anes PTL Lv   4 Current            3 SAB 19     SAB      2 Term 11   3345 g (7 lb 6 oz) M Vag-Spont  N NAN   1 Term 03   3770 g (8 lb 5 oz) M Vag-Spont  N NAN       Fetal data:  FHT: date 23 Time: 157 last night      MEDS:   Medication Administration - last 24 hours from 2023 0540 to 2023 0540       Date/Time Order Dose Route Action Action by     2023 0142 EST nitrofurantoin (MACROBID) extended-release capsule 100 mg 100 mg Oral Given During Sahra Brothers, RN     2023 014 EST fluticasone (FLONASE) 50 mcg/act nasal spray 1 spray 1 spray Each Nare Given During Sahra Syed RN        Current Facility-Administered Medications   Medication Dose Route Frequency   • acetaminophen (TYLENOL) tablet 650 mg  650 mg Oral Q4H PRN   • calcium carbonate (TUMS) chewable tablet 1,000 mg  1,000 mg Oral Daily PRN   • docusate sodium (COLACE) capsule 100 mg  100 mg Oral BID   • fluticasone (FLONASE) 50 mcg/act nasal spray 1 spray  1 spray Each Nare Daily   • nitrofurantoin (MACROBID) extended-release capsule 100 mg  100 mg Oral BID With Meals   • ondansetron (ZOFRAN) injection 4 mg  4 mg Intravenous Q8H PRN            Paul Patel MD  OBGYN, PGY-3  3/8/2023  5:40 AM

## 2023-03-08 NOTE — ASSESSMENT & PLAN NOTE
Lab Results   Component Value Date    HGBA1C 5 3 02/13/2023       No results for input(s): POCGLU in the last 72 hours      Blood Sugar Average: Last 72 hrs:     Diabetic diet   POCT fingersticks fasting and 2h post prandial

## 2023-03-08 NOTE — CONSULTS
Consultation - Maternal Fetal Medicine   Tiffani Dan 42 y o  female MRN: [id number]  Unit/Bed#: -01 Encounter: [id number]  Admit date: 3/7/2023  Today's date: 23    Assessment/Plan   Ms Drake Smith is a 42y o  year-old [id number] at [facility] 48 Day: 2, admitted for previable, PPROM  By issue:    *  premature rupture of membranes, antepartum  Assessment & Plan  Rupture of membranes on 3/7 at 1300   Nitrazine positive, minimal pooling, positive amnisure  No appreciable fluid on TAUS   She is afebrile, VSS   No leukocytosis noted   GC/CT pending , no other infections noted in the vagina   Cervix is 2cm 60% effaced and high by SVE   Discussed with Magalie To and her partner the diagnosis of previable PPROM and options at this gestational age  They are aware that at previable gestation, there are no resuscitative efforts that could be performed on baby in the event of delivery  The ability to provide resuscitative efforts would be readdressed in the 22nd week  Would not offer latency antibiotics, betamethasone or magnesium at this time due to gestational age  She and her partner are aware that about half of women will go into spontaneous labor within 1 week  She is at risk of infection and in the event that infection occurs that would be a contraindication to continuing the pregnancy as infection could lead to sepsis which poses significant risk the mother  Further, we discussed other risks including risk of placental abruption leading to significant hemorrhage and retained placenta  We briefly discussed option of termination of pregnancy and Magalie To is not interested  When she is discharged to home, she should monitor her symptoms closely- fever, chills, abdominal pain, nausea, vomiting, foul discharge and vaginal bleeding  If any of those should occur she should come in for evaluation  lena and her  verbalized undestanding       UTI in pregnancy  Assessment & Plan  UA positive for nitrites   Urine culture pending   Macrobid 100mg BID   Follow up culture and sensitivities     Gestational diabetes mellitus (GDM) in second trimester  Assessment & Plan  Lab Results   Component Value Date    HGBA1C 5 3 2023       No results for input(s): POCGLU in the last 72 hours  Blood Sugar Average: Last 72 hrs:     Diabetic diet   POCT fingersticks fasting and 2h post prandial     18 weeks gestation of pregnancy  Assessment & Plan  NIPS negative   Negative nuchal translucency   MSAFP not done    by TAUS   On LD ASA   Fetus in transverse presentation  Ultrasound to be completed today          Chief Complaint   Patient presents with   • Vaginal Discharge   • vaginal discharge     @ 1300 today       Physician Requesting Consult: Carolina Dixon MD  Reason for Consult / Principal Problem: previable PPROM   Subspeciality: Perinatology    HPI: As you know, Ms Julio C Blanco is a 42y o  year-old [id number] with an BRIT of Estimated Date of Delivery: 23 at 18w1d, Hospital Day: 2, admitted for observation in the setting of previable PPROM  Her current pregnancy is significant for AMA, history of  x2, long interval pregnancy, GDM  Patient presented to L&D triage after having evaluation in the office concerning for PPROM  She reports today around 1PM she felt a gush of fluid that ran down her leg  She was seen in the office and ROM work up- minimal pooling, positive nitrazine and no ferning  She was sent in for evaluation  On repeat examination, patient had minimal pooling, positive nitrazine and no ferning  KOH and wet prep were negative for infection  Amnisure was sent and returned positive  On bedside TAUS, there are no appreciable pockets of fluid  Baby is in transverse presentation    Other obstetric review of symptoms:  Contractions: None  Leakage of fluid: onset: 1300 on 3/8  Bleeding: None  Fetal movement: decreased from what she was previously feeling         Review of Systems   Constitutional: Negative for chills and fever  Eyes: Negative for visual disturbance  Respiratory: Negative for shortness of breath  Cardiovascular: Negative for chest pain  Gastrointestinal: Negative for abdominal pain, nausea and vomiting  Genitourinary: Positive for vaginal discharge  Negative for pelvic pain, vaginal bleeding and vaginal pain  Musculoskeletal: Negative for back pain  Neurological: Negative  Other history is as follows:    Historical Information   OB History    Para Term  AB Living   4 2 2   1 2   SAB IAB Ectopic Multiple Live Births   1       2      # Outcome Date GA Lbr Lambert/2nd Weight Sex Delivery Anes PTL Lv   4 Current            3 SAB 19     SAB      2 Term 11   3345 g (7 lb 6 oz) M Vag-Spont  N NAN   1 Term 03   3770 g (8 lb 5 oz) M Vag-Spont  N NAN     Gynecologic history: Patient's last menstrual period was 2022  Past Medical History:   Diagnosis Date   • Gestational diabetes mellitus (GDM) in second trimester 2023   • Urinary tract infection with hematuria 2018     Past Surgical History:   Procedure Laterality Date   • ABDOMINOPLASTY     • CORRECTION HAMMER TOE Right    • TOE SURGERY Right      Social History   Social History     Substance and Sexual Activity   Alcohol Use Not Currently   • Alcohol/week: 2 0 standard drinks   • Types: 2 Glasses of wine per week    Comment: monthly     Social History     Substance and Sexual Activity   Drug Use No     Social History     Tobacco Use   Smoking Status Former   • Types: Cigars   • Quit date: 3/15/2005   • Years since quittin 9   Smokeless Tobacco Never   Tobacco Comments    black and milds, occassionally       Meds/Allergies   Prior to Admission Medications   Prescriptions Last Dose Informant Patient Reported? Taking? Blood Glucose Monitoring Suppl (OneTouch Verio Reflect) w/Device KIT   No No   Sig: Dispense 1 kit per insurance formulary  GDM  Lancets (OneTouch Delica Plus [id number]) MISC   No No   Sig: Use 4 day  GDM  glucose blood (OneTouch Verio) test strip   No No   Sig: Test 4 times a day  GDM  prenatal vitamin (CLASSIC FORMULA) 27-0 8 mg 3/7/2023  No Yes   Sig: TAKE 1 TABLET BY MOUTH EVERY DAY      Facility-Administered Medications: None     Medication Administration - last 24 hours from 03/07/2023 0415 to 03/08/2023 0415       Date/Time Order Dose Route Action Action by     03/08/2023 0142 EST nitrofurantoin (MACROBID) extended-release capsule 100 mg 100 mg Oral Given During Sahra Brothers, RN     03/08/2023 0142 EST fluticasone (FLONASE) 50 mcg/act nasal spray 1 spray 1 spray Each Nare [facility] [address]        Current Facility-Administered Medications   Medication Dose Route Frequency   • acetaminophen (TYLENOL) tablet 650 mg  650 mg Oral Q4H PRN   • calcium carbonate (TUMS) chewable tablet 1,000 mg  1,000 mg Oral Daily PRN   • docusate sodium (COLACE) capsule 100 mg  100 mg Oral BID   • fluticasone (FLONASE) 50 mcg/act nasal spray 1 spray  1 spray Each Nare Daily   • nitrofurantoin (MACROBID) extended-release capsule 100 mg  100 mg Oral BID With Meals   • ondansetron (ZOFRAN) injection 4 mg  4 mg Intravenous Q8H PRN     No Known Allergies    Objective    Patient Vitals for the past 24 hrs:   BP Temp Temp src Pulse Resp SpO2 Height Weight   03/08/23 0325 126/71 98 3 °F (36 8 °C) Oral 93 16 99 % -- --   03/07/23 1811 122/75 -- -- 85 -- 99 % -- --   03/07/23 1804 122/75 98 7 °F (37 1 °C) Oral 98 18 96 % 5' 6" (1 676 m) 101 kg (223 lb)   03/07/23 1738 135/73 98 6 °F (37 °C) Oral 97 18 99 % -- --     Vitals: Blood pressure 126/71, pulse 93, temperature 98 3 °F (36 8 °C), temperature source Oral, resp  rate 16, height 5' 6" (1 676 m), weight 101 kg (223 lb), last menstrual period 11/01/2022, SpO2 99 %, not currently breastfeeding  Body mass index is 35 99 kg/m²      Physical Exam  Constitutional:       General: She is not in acute distress  Appearance: Normal appearance  She is not ill-appearing or toxic-appearing  HENT:      Head: Normocephalic and atraumatic  Cardiovascular:      Rate and Rhythm: Normal rate and regular rhythm  Pulmonary:      Effort: Pulmonary effort is normal    Abdominal:      Palpations: Abdomen is soft  Tenderness: There is no abdominal tenderness  There is no guarding or rebound  Musculoskeletal:      Right lower leg: No edema  Left lower leg: No edema  Neurological:      General: No focal deficit present  Mental Status: She is alert  Mental status is at baseline  Psychiatric:         Mood and Affect: Mood normal        Dilation: 2cm, Effacement:  60%, Consistency: medium and Position:  posterior    Prenatal Labs: I have personally reviewed pertinent reports        Results from last 7 days   Lab Units 03/07/23  2117   WBC Thousand/uL 10 05   NEUTROS ABS Thousands/µL 7 10   HEMOGLOBIN g/dL 12 1   MCV fL 91   PLATELETS Thousands/uL 296     Results from last 7 days   Lab Units 03/07/23 2117   NEUTROS PCT % 71   MONOS PCT % 7   EOS PCT % 1     Results from last 7 days   Lab Units 03/07/23  2117   POTASSIUM mmol/L 3 6   CHLORIDE mmol/L 105   CO2 mmol/L 25   BUN mg/dL 8   CREATININE mg/dL 0 54*   EGFR ml/min/1 73sq m 119     Results from last 7 days   Lab Units 03/07/23  2117   AST U/L 13   ALT U/L 12   ALK PHOS U/L 100     Results from last 7 days   Lab Units 03/07/23  2117   PLATELETS Thousands/uL 296                           Fetal data:   by NURY  No fluid detected around baby by bedside NUYR Dolan MD  3/8/2023  4:15 AM

## 2023-03-08 NOTE — DISCHARGE INSTRUCTIONS
Premature Rupture of Membranes   WHAT YOU NEED TO KNOW:   What is premature rupture of membranes (PROM)? PROM means your water broke before labor began  The amniotic sac contains fluid that surrounds and protects your unborn baby in your uterus  If PROM happens before 37 weeks of pregnancy, it is called  PROM  You may feel a gush of warm fluid or a slow trickle of fluid from your vagina  What increases my risk for PROM? Premature delivery or PROM in a past pregnancy    Infection, such as a sexually transmitted infection (STI), pneumonia, or urinary tract infection (UTI)    Large uterus caused by more than 1 baby or extra amniotic fluid    Surgery on your cervix or an amniocentesis    Abnormally shaped uterus or a short cervix    Vaginal bleeding during the second or third trimester    Poor nutrition, alcohol, or smoking    How is PROM treated? Healthcare providers will monitor you and your baby  Healthcare providers may monitor your baby's heartbeat and the contractions of your uterus  Ultrasound pictures may be used to check the placenta and the amount of amniotic fluid in your uterus  A sample of fluid from your vagina or rectum may be checked for certain infections that can be passed to your baby  You may be given antibiotics to prevent an infection during delivery  You may also need steroids to decrease the risk for complications due to PROM  Depending on how far along your pregnancy is, your healthcare provider will decide how to manage your treatment:     34 weeks or more: Your baby will be delivered  Vaginal delivery may used if healthcare providers can allow labor to continue naturally  Healthcare providers may wait to see how you and your baby are doing  They may give you medicine to slow contractions, or they may need to induce (start) labor  A  may be needed if your labor is not progressing or if your baby has problems, such as a low heart rate   You will be given antibiotics to prevent an infection in you or your baby during the   24 weeks to the end of 33 weeks: Your healthcare providers will watch and wait for signs of problems or for the labor process to happen naturally  You may need medicine to start labor at a later time  You may also be given steroids to help your baby's lungs develop faster  Medicine may be needed to stop contractions if your baby's lungs are not fully developed  Before 24 weeks: Your healthcare provider will discuss with you the best treatment  CARE AGREEMENT:   You have the right to help plan your care  Learn about your health condition and how it may be treated  Discuss treatment options with your healthcare providers to decide what care you want to receive  You always have the right to refuse treatment  The above information is an  only  It is not intended as medical advice for individual conditions or treatments  Talk to your doctor, nurse or pharmacist before following any medical regimen to see if it is safe and effective for you  © Copyright Elio [employer]ostegui  Information is for End User's use only and may not be sold, redistributed or otherwise used for commercial purposes

## 2023-03-08 NOTE — H&P
Geovanni Grade 42 y o  female MRN: [id number]  Unit/Bed#: -01 Encounter: [id number]    Assessment: 42 y o  [id number] at 18w0d admitted for  premature rupture of membranes  SVE: 2/60%/-4   FHT: 157bpm  Transverse position  GBS status: unknown      Plan:     premature rupture of membranes, antepartum  Assessment & Plan  Reported ROM time :3/7 at 1300   Speculum : Scant  Clear fluid in vaginal vault,Nitrazine positive, Wet mt/KOH negative   Positive amnisure  Afebrile no tachycardia, F/U CBC  GC/CT pending  MFM consult   18 weeks gestation of pregnancy  Assessment & Plan  NIPS negative   Negative nuchal translucency   MSAFP not done    by TAUS   On LD ASA   Fetus in transverse presentation  UTI in pregnancy  Assessment & Plan  UA positive for nitrites   Urine culture pending   Macrobid 100mg BID   Follow up culture and sensitivities     Gestational diabetes mellitus (GDM) in second trimester  Assessment & Plan  Lab Results   Component Value Date    HGBA1C 5 3 2023       No results for input(s): POCGLU in the last 72 hours  Blood Sugar Average: Last 72 hrs:     Diabetic diet   POCT fingersticks fasting and 2h post prandial       Discussed case and plan w/ Dr Emerald Jacques    Chief Complaint: leaking fluid    HPI: Katelynn Hector is a 42 y o  [id number] with an BRIT of 2023, by Last Menstrual Period at 18w0d who is being admitted for  premature rupture of membranes  She reports that she first noticed leakage of fluid at 1300 today  She reports that the fluid had no odor and was clear  She denies recent sexual intercourse  She denies having uterine contractions and currently endorses  light LOF, and denies vaginal bleeding  She states that fetal movement has been inconsistent  She denies fever and chills       Patient Active Problem List   Diagnosis   • History of abnormal cervical Pap smear   • Urinary tract infection with hematuria   • UTI symptoms   • Encounter for gynecological examination without abnormal finding   • Routine screening for STI (sexually transmitted infection)   • Bacterial vaginosis   • Vaginal odor   • First trimester pregnancy   • 18 weeks gestation of pregnancy   • Multigravida of advanced maternal age in second trimester   • Obesity affecting pregnancy in second trimester   • Gestational diabetes mellitus (GDM) in second trimester   • BMI 36 0-36 9,adult   •  premature rupture of membranes, antepartum   • UTI in pregnancy       Baby complications/comments: none    Review of Systems   Constitutional: Negative for chills and fever  Respiratory: Negative for shortness of breath  Cardiovascular: Negative for chest pain and palpitations  Gastrointestinal: Negative for abdominal pain, nausea and vomiting  Genitourinary: Positive for vaginal discharge (a trickle of fluid)  Negative for dysuria, hematuria, vaginal bleeding and vaginal pain  Neurological: Negative for headaches         OB Hx:  OB History    Para Term  AB Living   4 2 2   1 2   SAB IAB Ectopic Multiple Live Births   1       2      # Outcome Date GA Lbr Lambert/2nd Weight Sex Delivery Anes PTL Lv   4 Current            3 SAB 19     SAB      2 Term 11   3345 g (7 lb 6 oz) M Vag-Spont  N NAN   1 Term 03   3770 g (8 lb 5 oz) M Vag-Spont  N NAN       Past Medical Hx:  Past Medical History:   Diagnosis Date   • Gestational diabetes mellitus (GDM) in second trimester 2023   • Urinary tract infection with hematuria 2018       Past Surgical hx:  Past Surgical History:   Procedure Laterality Date   • ABDOMINOPLASTY     • CORRECTION HAMMER TOE Right    • TOE SURGERY Right        Social Hx:  Alcohol use: denies   Tobacco use: denies  Other substance use: denies    Other: none    No Known Allergies    Medications Prior to Admission   Medication   • prenatal vitamin (CLASSIC FORMULA) 27-0 8 mg   • Blood Glucose Monitoring Suppl (OneTouch Verio Reflect) w/Device KIT   • glucose blood (OneTouch Verio) test strip   • Lancets (OneTouch Delica Plus [id number]) MISC       Objective:  Temp:  [98 3 °F (36 8 °C)-98 7 °F (37 1 °C)] 98 3 °F (36 8 °C)  HR:  [85-99] 93  Resp:  [16-18] 16  BP: (122-135)/(71-77) 126/71  Body mass index is 35 99 kg/m²  Physical Exam:  Physical Exam  Constitutional:       Appearance: Normal appearance  Genitourinary:      Vulva normal    HENT:      Head: Normocephalic and atraumatic  Eyes:      Extraocular Movements: Extraocular movements intact  Abdominal:      General: There is distension (gravid , non-tender on palpation)  Palpations: Abdomen is soft  Musculoskeletal:      Cervical back: Normal range of motion  Neurological:      General: No focal deficit present  Mental Status: She is alert  Skin:     General: Skin is warm and dry     Psychiatric:         Mood and Affect: Mood normal             FHT:    157bpm       TOCO:   No contractions noted    Lab Results   Component Value Date    WBC 10 05 03/07/2023    HGB 12 1 03/07/2023    HCT 36 2 03/07/2023     03/07/2023     Lab Results   Component Value Date    K 3 6 03/07/2023     03/07/2023    CO2 25 03/07/2023    BUN 8 03/07/2023    CREATININE 0 54 (L) 03/07/2023    AST 13 03/07/2023    ALT 12 03/07/2023     Prenatal Labs: Reviewed      Blood type: O positive   Antibody: negative   GBS: unknown   HIV: negative   Rubella: Immune  VDRL/RPR: Non reactive  HBsAg: Negative  Chlamydia: Negative  Gonorrhea: Negative  Diabetes 1 hour screen: 187  3 hour glucose: NA  Platelets: 143  Hgb: 12 3  >2 Midnights  INPATIENT     Signature/Title: Faina Do MD  Date: 3/8/2023  Time: 7:10 AM

## 2023-03-08 NOTE — ASSESSMENT & PLAN NOTE
UA positive for nitrites   Urine culture pending   Macrobid 100mg BID   Follow up culture and sensitivities

## 2023-03-08 NOTE — PLAN OF CARE
Problem: ANTEPARTUM  Goal: Maintain pregnancy as long as maternal and/or fetal condition is stable  Description: INTERVENTIONS:  - Maternal surveillance  - Fetal surveillance  - Monitor uterine activity  - Medications as ordered  - Bedrest  Outcome: Progressing     Problem: Knowledge Deficit  Goal: Patient/family/caregiver demonstrates understanding of disease process, treatment plan, medications, and discharge instructions  Description: Complete learning assessment and assess knowledge base    Interventions:  - Provide teaching at level of understanding  - Provide teaching via preferred learning methods  Outcome: Progressing     Problem: DISCHARGE PLANNING  Goal: Discharge to home or other facility with appropriate resources  Description: INTERVENTIONS:  - Identify barriers to discharge w/patient and caregiver  - Arrange for needed discharge resources and transportation as appropriate  - Identify discharge learning needs (meds, wound care, etc )  - Arrange for interpretive services to assist at discharge as needed  - Refer to Case Management Department for coordinating discharge planning if the patient needs post-hospital services based on physician/advanced practitioner order or complex needs related to functional status, cognitive ability, or social support system  Outcome: Progressing

## 2023-03-09 ENCOUNTER — ROUTINE PRENATAL (OUTPATIENT)
Facility: HOSPITAL | Age: 38
End: 2023-03-09

## 2023-03-09 VITALS
SYSTOLIC BLOOD PRESSURE: 100 MMHG | WEIGHT: 224 LBS | DIASTOLIC BLOOD PRESSURE: 62 MMHG | HEART RATE: 110 BPM | HEIGHT: 66 IN | BODY MASS INDEX: 36 KG/M2

## 2023-03-09 DIAGNOSIS — O99.212 OBESITY AFFECTING PREGNANCY IN SECOND TRIMESTER: ICD-10-CM

## 2023-03-09 DIAGNOSIS — O24.419 GESTATIONAL DIABETES MELLITUS (GDM) IN SECOND TRIMESTER, GESTATIONAL DIABETES METHOD OF CONTROL UNSPECIFIED: ICD-10-CM

## 2023-03-09 DIAGNOSIS — Z36.86 ENCOUNTER FOR ANTENATAL SCREENING FOR CERVICAL LENGTH: ICD-10-CM

## 2023-03-09 DIAGNOSIS — Z3A.18 18 WEEKS GESTATION OF PREGNANCY: Primary | ICD-10-CM

## 2023-03-09 DIAGNOSIS — Z36.3 ENCOUNTER FOR ANTENATAL SCREENING FOR MALFORMATION: ICD-10-CM

## 2023-03-09 DIAGNOSIS — O42.919 PRETERM PREMATURE RUPTURE OF MEMBRANES (PPROM) WITH UNKNOWN ONSET OF LABOR: ICD-10-CM

## 2023-03-09 DIAGNOSIS — O09.522 MULTIGRAVIDA OF ADVANCED MATERNAL AGE IN SECOND TRIMESTER: ICD-10-CM

## 2023-03-09 NOTE — PROGRESS NOTES
Ultrasound Probe Disinfection    A transvaginal ultrasound was performed  Prior to use, disinfection was performed with High Level Disinfection Process (Trophon)  Probe serial number [id number]: [id number] was used        Merrill Berg  03/09/23  2:21 PM

## 2023-03-09 NOTE — PROGRESS NOTES
[address]: Ms Ferny Mcmahan was seen today for early anatomy imaging  See ultrasound report under "OB Procedures" tab  The time spent on this established patient on the encounter date included 5 minutes previsit service time reviewing records and precharting, 10 minutes face-to-face service time counseling regarding results and coordinating care, and  5 minutes charting, totalling 20 minutes    Please don't hesitate to contact our office with any concerns or questions   -Charlette Maldonado MD

## 2023-03-10 LAB — BACTERIA UR CULT: ABNORMAL

## 2023-05-07 PROBLEM — O23.40 UTI IN PREGNANCY: Status: RESOLVED | Noted: 2023-03-08 | Resolved: 2023-05-07

## 2023-07-19 ENCOUNTER — OFFICE VISIT (OUTPATIENT)
Dept: FAMILY MEDICINE CLINIC | Facility: CLINIC | Age: 38
End: 2023-07-19

## 2023-07-19 VITALS
HEIGHT: 66 IN | WEIGHT: 202.8 LBS | HEART RATE: 97 BPM | RESPIRATION RATE: 18 BRPM | TEMPERATURE: 98.3 F | SYSTOLIC BLOOD PRESSURE: 120 MMHG | OXYGEN SATURATION: 98 % | DIASTOLIC BLOOD PRESSURE: 60 MMHG | BODY MASS INDEX: 32.59 KG/M2

## 2023-07-19 DIAGNOSIS — M54.2 CERVICAL PAIN (NECK): ICD-10-CM

## 2023-07-19 DIAGNOSIS — Z86.32 HISTORY OF GESTATIONAL DIABETES: ICD-10-CM

## 2023-07-19 DIAGNOSIS — Z76.89 ENCOUNTER TO ESTABLISH CARE: Primary | ICD-10-CM

## 2023-07-19 DIAGNOSIS — M54.50 LUMBAR BACK PAIN: ICD-10-CM

## 2023-07-19 RX ORDER — CYCLOBENZAPRINE HCL 10 MG
10 TABLET ORAL
Qty: 20 TABLET | Refills: 1 | Status: SHIPPED | OUTPATIENT
Start: 2023-07-19
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
## 2023-07-19 NOTE — ASSESSMENT & PLAN NOTE
Patient had gestational diabetes during her last pregnancy state in March 2023.  -Ordered hemoglobin A1c to screen for diabetes

## 2023-07-25 ENCOUNTER — APPOINTMENT (OUTPATIENT)
Dept: LAB | Facility: CLINIC | Age: 38
End: 2023-07-25
Payer: MEDICARE

## 2023-07-25 ENCOUNTER — OFFICE VISIT (OUTPATIENT)
Dept: OBGYN CLINIC | Facility: CLINIC | Age: 38
End: 2023-07-25

## 2023-07-25 VITALS
BODY MASS INDEX: 32.28 KG/M2 | HEART RATE: 92 BPM | DIASTOLIC BLOOD PRESSURE: 82 MMHG | SYSTOLIC BLOOD PRESSURE: 135 MMHG | WEIGHT: 200 LBS | RESPIRATION RATE: 16 BRPM

## 2023-07-25 DIAGNOSIS — Z86.32 HISTORY OF GESTATIONAL DIABETES: ICD-10-CM

## 2023-07-25 DIAGNOSIS — Z76.89 ENCOUNTER TO ESTABLISH CARE: ICD-10-CM

## 2023-07-25 DIAGNOSIS — R35.0 URINARY FREQUENCY: Primary | ICD-10-CM

## 2023-07-25 LAB
ALBUMIN SERPL BCP-MCNC: 3.6 G/DL (ref 3.5–5)
ALP SERPL-CCNC: 84 U/L (ref 46–116)
ALT SERPL W P-5'-P-CCNC: 20 U/L (ref 12–78)
ANION GAP SERPL CALCULATED.3IONS-SCNC: 4 MMOL/L
AST SERPL W P-5'-P-CCNC: 14 U/L (ref 5–45)
BASOPHILS # BLD AUTO: 0.03 THOUSANDS/ÂΜL (ref 0–0.1)
BASOPHILS NFR BLD AUTO: 1 % (ref 0–1)
BILIRUB SERPL-MCNC: 0.42 MG/DL (ref 0.2–1)
BUN SERPL-MCNC: 8 MG/DL (ref 5–25)
CALCIUM SERPL-MCNC: 9.1 MG/DL (ref 8.3–10.1)
CHLORIDE SERPL-SCNC: 107 MMOL/L (ref 96–108)
CHOLEST SERPL-MCNC: 206 MG/DL
CO2 SERPL-SCNC: 27 MMOL/L (ref 21–32)
CREAT SERPL-MCNC: 0.83 MG/DL (ref 0.6–1.3)
EOSINOPHIL # BLD AUTO: 0.07 THOUSAND/ÂΜL (ref 0–0.61)
EOSINOPHIL NFR BLD AUTO: 2 % (ref 0–6)
ERYTHROCYTE [DISTWIDTH] IN BLOOD BY AUTOMATED COUNT: 22.7 % (ref 11.6–15.1)
GFR SERPL CREATININE-BSD FRML MDRD: 89 ML/MIN/1.73SQ M
GLUCOSE P FAST SERPL-MCNC: 99 MG/DL (ref 65–99)
HCT VFR BLD AUTO: 35.2 % (ref 34.8–46.1)
HDLC SERPL-MCNC: 57 MG/DL
HGB BLD-MCNC: 10.6 G/DL (ref 11.5–15.4)
IMM GRANULOCYTES # BLD AUTO: 0.01 THOUSAND/UL (ref 0–0.2)
IMM GRANULOCYTES NFR BLD AUTO: 0 % (ref 0–2)
LDLC SERPL CALC-MCNC: 139 MG/DL (ref 0–100)
LYMPHOCYTES # BLD AUTO: 1.84 THOUSANDS/ÂΜL (ref 0.6–4.47)
LYMPHOCYTES NFR BLD AUTO: 40 % (ref 14–44)
MCH RBC QN AUTO: 22.4 PG (ref 26.8–34.3)
MCHC RBC AUTO-ENTMCNC: 30.1 G/DL (ref 31.4–37.4)
MCV RBC AUTO: 74 FL (ref 82–98)
MONOCYTES # BLD AUTO: 0.4 THOUSAND/ÂΜL (ref 0.17–1.22)
MONOCYTES NFR BLD AUTO: 9 % (ref 4–12)
NEUTROPHILS # BLD AUTO: 2.25 THOUSANDS/ÂΜL (ref 1.85–7.62)
NEUTS SEG NFR BLD AUTO: 48 % (ref 43–75)
NONHDLC SERPL-MCNC: 149 MG/DL
NRBC BLD AUTO-RTO: 0 /100 WBCS
PLATELET # BLD AUTO: 435 THOUSANDS/UL (ref 149–390)
PMV BLD AUTO: 9 FL (ref 8.9–12.7)
POTASSIUM SERPL-SCNC: 4.2 MMOL/L (ref 3.5–5.3)
PROT SERPL-MCNC: 7.9 G/DL (ref 6.4–8.4)
RBC # BLD AUTO: 4.74 MILLION/UL (ref 3.81–5.12)
SL AMB  POCT GLUCOSE, UA: NEGATIVE
SL AMB LEUKOCYTE ESTERASE,UA: NEGATIVE
SL AMB POCT BILIRUBIN,UA: NEGATIVE
SL AMB POCT BLOOD,UA: NEGATIVE
SL AMB POCT CLARITY,UA: CLEAR
SL AMB POCT COLOR,UA: YELLOW
SL AMB POCT KETONES,UA: NEGATIVE
SL AMB POCT NITRITE,UA: NEGATIVE
SL AMB POCT PH,UA: 5
SL AMB POCT SPECIFIC GRAVITY,UA: 1.02
SL AMB POCT URINE PROTEIN: NORMAL
SL AMB POCT UROBILINOGEN: 0.2
SODIUM SERPL-SCNC: 138 MMOL/L (ref 135–147)
TRIGL SERPL-MCNC: 49 MG/DL
WBC # BLD AUTO: 4.6 THOUSAND/UL (ref 4.31–10.16)

## 2023-07-25 PROCEDURE — 85025 COMPLETE CBC W/AUTO DIFF WBC: CPT

## 2023-07-25 PROCEDURE — 99213 OFFICE O/P EST LOW 20 MIN: CPT | Performed by: OBSTETRICS & GYNECOLOGY

## 2023-07-25 PROCEDURE — 36415 COLL VENOUS BLD VENIPUNCTURE: CPT

## 2023-07-25 PROCEDURE — 81002 URINALYSIS NONAUTO W/O SCOPE: CPT | Performed by: OBSTETRICS & GYNECOLOGY

## 2023-07-25 PROCEDURE — 80053 COMPREHEN METABOLIC PANEL: CPT

## 2023-07-25 PROCEDURE — 80061 LIPID PANEL: CPT

## 2023-07-25 PROCEDURE — 87086 URINE CULTURE/COLONY COUNT: CPT | Performed by: OBSTETRICS & GYNECOLOGY

## 2023-07-25 NOTE — PROGRESS NOTES
OB/GYN VISIT  Olivia Portillo  7/25/2023  11:33 AM      Assessment/Plan:    Olivia Portillo is a 42 y.o. [id number] female with concern for a UTI. We reviewed that a change in odor can result from certain food ingestion, such as asparagus. The patient is currently asymptomatic. She does not recall what she may have eaten for this change in odor. UA negative in the office, urine culture sent and will contact patient with results. Subjective:     Olivia Portillo is a 42 y.o. [id number] female who presents for evaluation of foul odor in her urine last week that has now resolved. She denies any discharge, itching or fishy odor to the urine. She also denies any dysuria or increase in urinary frequency. Objective:    Vitals: Blood pressure 135/82, pulse 92, resp. rate 16, weight 90.7 kg (200 lb), last menstrual period 11/01/2022, not currently breastfeeding. Body mass index is 32.28 kg/m². Past Medical History:   Diagnosis Date   • Gestational diabetes mellitus (GDM) in second trimester 2/13/2023   • Urinary tract infection with hematuria 11/5/2018     Past Surgical History:   Procedure Laterality Date   • ABDOMINOPLASTY     • CORRECTION HAMMER TOE Right    • TOE SURGERY Right 2005       Physical Exam  Constitutional:       Appearance: Normal appearance. HENT:      Head: Normocephalic and atraumatic. Eyes:      Extraocular Movements: Extraocular movements intact. Cardiovascular:      Rate and Rhythm: Normal rate. Pulmonary:      Effort: Pulmonary effort is normal.   Musculoskeletal:         General: Normal range of motion. Neurological:      Mental Status: She is alert. Mental status is at baseline.    Psychiatric:         Mood and Affect: Mood normal.         Behavior: Behavior normal.           Slava Aggarwal MD  7/25/2023  11:33 AM

## 2023-07-26 ENCOUNTER — TELEPHONE (OUTPATIENT)
Dept: FAMILY MEDICINE CLINIC | Facility: CLINIC | Age: 38
End: 2023-07-26

## 2023-07-26 DIAGNOSIS — D50.9 IRON DEFICIENCY ANEMIA, UNSPECIFIED IRON DEFICIENCY ANEMIA TYPE: Primary | ICD-10-CM

## 2023-07-26 PROBLEM — R73.03 PREDIABETES: Status: ACTIVE | Noted: 2023-07-26

## 2023-07-26 LAB — BACTERIA UR CULT: NORMAL

## 2023-07-26 RX ORDER — FERROUS SULFATE TAB EC 324 MG (65 MG FE EQUIVALENT) 324 (65 FE) MG
324 TABLET DELAYED RESPONSE ORAL
Qty: 240 TABLET | Refills: 1 | Status: SHIPPED | OUTPATIENT
Start: 2023-07-26

## 2023-07-26 NOTE — TELEPHONE ENCOUNTER
Patient was called and updated on lab results. Patient was given pre-diabetic education on limiting carbohydrates and increasing physical activity. Patient agreed to try walking daily. Patient also was informed that we will schedule a visit to go into more details on diabetic diet and physical activity. Patient also found to have iron deficiency anemia on cbc. Ferrous sulfate script sent over to the pharmacy. Patient instructed to take 1 tablet twice daily with meals. If possible she could take with orange juice to increase the likelihood of absorption. Patient was instructed that constipation is a common side effect of the supplement. Patient should take docusate if this becomes an issue. She can also try a fiber supplement or MiraLAX preventatively to get ahead of possible constipation. We will readdress a CBC and hemoglobin A1c in 3 months to see status of prediabetes and iron deficiency anemia. Please schedule patient for a 1 month follow-up for diabetes education and nutrition recommendations.

## 2023-07-31 ENCOUNTER — PROCEDURE VISIT (OUTPATIENT)
Dept: FAMILY MEDICINE CLINIC | Facility: CLINIC | Age: 38
End: 2023-07-31
Payer: MEDICARE

## 2023-07-31 ENCOUNTER — TELEPHONE (OUTPATIENT)
Dept: OBGYN CLINIC | Facility: CLINIC | Age: 38
End: 2023-07-31

## 2023-07-31 DIAGNOSIS — M99.03 SOMATIC DYSFUNCTION OF LUMBAR REGION: ICD-10-CM

## 2023-07-31 DIAGNOSIS — M99.05 SOMATIC DYSFUNCTION OF PELVIC REGION: ICD-10-CM

## 2023-07-31 DIAGNOSIS — M99.04 SOMATIC DYSFUNCTION OF SACRAL REGION: ICD-10-CM

## 2023-07-31 DIAGNOSIS — M54.50 LUMBAR BACK PAIN: Primary | ICD-10-CM

## 2023-07-31 DIAGNOSIS — M99.01 SOMATIC DYSFUNCTION OF CERVICAL REGION: ICD-10-CM

## 2023-07-31 DIAGNOSIS — M54.2 CERVICAL PAIN (NECK): ICD-10-CM

## 2023-07-31 DIAGNOSIS — M99.02 SOMATIC DYSFUNCTION OF THORACIC REGION: ICD-10-CM

## 2023-07-31 PROCEDURE — 98926 OSTEOPATH MANJ 3-4 REGIONS: CPT | Performed by: FAMILY MEDICINE

## 2023-07-31 RX ORDER — METHOCARBAMOL 500 MG/1
500 TABLET, FILM COATED ORAL 4 TIMES DAILY
Qty: 30 TABLET | Refills: 0 | Status: SHIPPED | OUTPATIENT
Start: 2023-07-31
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
## 2023-07-31 NOTE — TELEPHONE ENCOUNTER
----- Message from Colby Pacheco MD sent at 7/31/2023  7:35 AM EDT -----  Please inform the patient that she does not have a UTI. Thank you!

## 2023-08-03 ENCOUNTER — EVALUATION (OUTPATIENT)
Dept: PHYSICAL THERAPY | Facility: REHABILITATION | Age: 38
End: 2023-08-03
Payer: MEDICARE

## 2023-08-03 DIAGNOSIS — M54.2 CERVICAL PAIN (NECK): ICD-10-CM

## 2023-08-03 DIAGNOSIS — M54.50 LUMBAR BACK PAIN: ICD-10-CM

## 2023-08-03 PROCEDURE — 97162 PT EVAL MOD COMPLEX 30 MIN: CPT | Performed by: PHYSICAL THERAPIST

## 2023-08-03 PROCEDURE — 97112 NEUROMUSCULAR REEDUCATION: CPT | Performed by: PHYSICAL THERAPIST
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
## 2023-08-07 ENCOUNTER — OFFICE VISIT (OUTPATIENT)
Dept: PHYSICAL THERAPY | Facility: REHABILITATION | Age: 38
End: 2023-08-07
Payer: MEDICARE

## 2023-08-07 DIAGNOSIS — M54.50 LUMBAR BACK PAIN: Primary | ICD-10-CM

## 2023-08-07 DIAGNOSIS — M54.2 CERVICAL PAIN (NECK): ICD-10-CM

## 2023-08-07 PROCEDURE — 97110 THERAPEUTIC EXERCISES: CPT

## 2023-08-07 PROCEDURE — 97112 NEUROMUSCULAR REEDUCATION: CPT
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
## 2023-08-11 ENCOUNTER — HOSPITAL ENCOUNTER (EMERGENCY)
Facility: HOSPITAL | Age: 38
Discharge: HOME | End: 2023-08-12
Attending: EMERGENCY MEDICINE
Payer: MEDICARE

## 2023-08-11 ENCOUNTER — APPOINTMENT (EMERGENCY)
Dept: CT IMAGING | Facility: HOSPITAL | Age: 38
End: 2023-08-11
Payer: MEDICARE

## 2023-08-11 DIAGNOSIS — N83.201 RIGHT OVARIAN CYST: ICD-10-CM

## 2023-08-11 DIAGNOSIS — N20.0 STONE IN KIDNEY: ICD-10-CM

## 2023-08-11 DIAGNOSIS — N12 PYELONEPHRITIS: Primary | ICD-10-CM

## 2023-08-11 LAB
ALBUMIN SERPL BCP-MCNC: 4.3 G/DL (ref 3.5–5)
ALP SERPL-CCNC: 84 U/L (ref 34–104)
ALT SERPL W P-5'-P-CCNC: 6 U/L (ref 7–52)
ANION GAP SERPL CALCULATED.3IONS-SCNC: 11 MMOL/L
AST SERPL W P-5'-P-CCNC: 10 U/L (ref 13–39)
BACTERIA UR QL AUTO: ABNORMAL /HPF
BASOPHILS # BLD AUTO: 0.04 THOUSANDS/ÂΜL (ref 0–0.1)
BASOPHILS NFR BLD AUTO: 0 % (ref 0–1)
BILIRUB SERPL-MCNC: 0.82 MG/DL (ref 0.2–1)
BILIRUB UR QL STRIP: NEGATIVE
BUN SERPL-MCNC: 7 MG/DL (ref 5–25)
CALCIUM SERPL-MCNC: 9.5 MG/DL (ref 8.4–10.2)
CHLORIDE SERPL-SCNC: 98 MMOL/L (ref 96–108)
CLARITY UR: ABNORMAL
CO2 SERPL-SCNC: 22 MMOL/L (ref 21–32)
COLOR UR: YELLOW
CREAT SERPL-MCNC: 0.85 MG/DL (ref 0.6–1.3)
EOSINOPHIL # BLD AUTO: 0.01 THOUSAND/ÂΜL (ref 0–0.61)
EOSINOPHIL NFR BLD AUTO: 0 % (ref 0–6)
ERYTHROCYTE [DISTWIDTH] IN BLOOD BY AUTOMATED COUNT: 21.7 % (ref 11.6–15.1)
EXT PREGNANCY TEST URINE: NEGATIVE
EXT. CONTROL: NORMAL
GFR SERPL CREATININE-BSD FRML MDRD: 86 ML/MIN/1.73SQ M
GLUCOSE SERPL-MCNC: 102 MG/DL (ref 65–140)
GLUCOSE UR STRIP-MCNC: NEGATIVE MG/DL
HCT VFR BLD AUTO: 34.5 % (ref 34.8–46.1)
HGB BLD-MCNC: 11 G/DL (ref 11.5–15.4)
HGB UR QL STRIP.AUTO: ABNORMAL
IMM GRANULOCYTES # BLD AUTO: 0.03 THOUSAND/UL (ref 0–0.2)
IMM GRANULOCYTES NFR BLD AUTO: 0 % (ref 0–2)
KETONES UR STRIP-MCNC: ABNORMAL MG/DL
LACTATE SERPL-SCNC: 0.8 MMOL/L (ref 0.5–2)
LEUKOCYTE ESTERASE UR QL STRIP: ABNORMAL
LIPASE SERPL-CCNC: 12 U/L (ref 11–82)
LYMPHOCYTES # BLD AUTO: 1.56 THOUSANDS/ÂΜL (ref 0.6–4.47)
LYMPHOCYTES NFR BLD AUTO: 15 % (ref 14–44)
MCH RBC QN AUTO: 23.7 PG (ref 26.8–34.3)
MCHC RBC AUTO-ENTMCNC: 31.9 G/DL (ref 31.4–37.4)
MCV RBC AUTO: 74 FL (ref 82–98)
MONOCYTES # BLD AUTO: 1.13 THOUSAND/ÂΜL (ref 0.17–1.22)
MONOCYTES NFR BLD AUTO: 11 % (ref 4–12)
MUCOUS THREADS UR QL AUTO: ABNORMAL
NEUTROPHILS # BLD AUTO: 7.89 THOUSANDS/ÂΜL (ref 1.85–7.62)
NEUTS SEG NFR BLD AUTO: 74 % (ref 43–75)
NITRITE UR QL STRIP: POSITIVE
NON-SQ EPI CELLS URNS QL MICRO: ABNORMAL /HPF
NRBC BLD AUTO-RTO: 0 /100 WBCS
PH UR STRIP.AUTO: 6 [PH]
PLATELET # BLD AUTO: 319 THOUSANDS/UL (ref 149–390)
PMV BLD AUTO: 8.5 FL (ref 8.9–12.7)
POTASSIUM SERPL-SCNC: 3.3 MMOL/L (ref 3.5–5.3)
PROCALCITONIN SERPL-MCNC: 0.1 NG/ML
PROT SERPL-MCNC: 8.1 G/DL (ref 6.4–8.4)
PROT UR STRIP-MCNC: ABNORMAL MG/DL
RBC # BLD AUTO: 4.65 MILLION/UL (ref 3.81–5.12)
RBC #/AREA URNS AUTO: ABNORMAL /HPF
SODIUM SERPL-SCNC: 131 MMOL/L (ref 135–147)
SP GR UR STRIP.AUTO: 1.01 (ref 1–1.03)
UROBILINOGEN UR QL STRIP.AUTO: 1 E.U./DL
WBC # BLD AUTO: 10.66 THOUSAND/UL (ref 4.31–10.16)
WBC #/AREA URNS AUTO: ABNORMAL /HPF

## 2023-08-11 PROCEDURE — 36415 COLL VENOUS BLD VENIPUNCTURE: CPT | Performed by: EMERGENCY MEDICINE

## 2023-08-11 PROCEDURE — 80053 COMPREHEN METABOLIC PANEL: CPT | Performed by: EMERGENCY MEDICINE

## 2023-08-11 PROCEDURE — 83605 ASSAY OF LACTIC ACID: CPT | Performed by: EMERGENCY MEDICINE

## 2023-08-11 PROCEDURE — 85025 COMPLETE CBC W/AUTO DIFF WBC: CPT | Performed by: EMERGENCY MEDICINE

## 2023-08-11 PROCEDURE — 81025 URINE PREGNANCY TEST: CPT | Performed by: EMERGENCY MEDICINE

## 2023-08-11 PROCEDURE — 83690 ASSAY OF LIPASE: CPT | Performed by: EMERGENCY MEDICINE

## 2023-08-11 PROCEDURE — G1004 CDSM NDSC: HCPCS

## 2023-08-11 PROCEDURE — 84145 PROCALCITONIN (PCT): CPT | Performed by: EMERGENCY MEDICINE

## 2023-08-11 PROCEDURE — 74177 CT ABD & PELVIS W/CONTRAST: CPT

## 2023-08-11 PROCEDURE — 81001 URINALYSIS AUTO W/SCOPE: CPT | Performed by: EMERGENCY MEDICINE

## 2023-08-11 PROCEDURE — 87086 URINE CULTURE/COLONY COUNT: CPT | Performed by: EMERGENCY MEDICINE

## 2023-08-11 PROCEDURE — 87186 SC STD MICRODIL/AGAR DIL: CPT | Performed by: EMERGENCY MEDICINE

## 2023-08-11 PROCEDURE — 87077 CULTURE AEROBIC IDENTIFY: CPT | Performed by: EMERGENCY MEDICINE

## 2023-08-11 RX ORDER — KETOROLAC TROMETHAMINE 30 MG/ML
15 INJECTION, SOLUTION INTRAMUSCULAR; INTRAVENOUS ONCE
Status: COMPLETED | OUTPATIENT
Start: 2023-08-11 | End: 2023-08-11

## 2023-08-11 RX ORDER — ACETAMINOPHEN 325 MG/1
975 TABLET ORAL ONCE
Status: COMPLETED | OUTPATIENT
Start: 2023-08-11 | End: 2023-08-11

## 2023-08-11 RX ADMIN — ACETAMINOPHEN 975 MG: 325 TABLET, FILM COATED ORAL at 22:42

## 2023-08-11 RX ADMIN — SODIUM CHLORIDE 1000 ML: 0.9 INJECTION, SOLUTION INTRAVENOUS at 22:41

## 2023-08-11 RX ADMIN — IOHEXOL 100 ML: 350 INJECTION, SOLUTION INTRAVENOUS at 23:27

## 2023-08-11 RX ADMIN — KETOROLAC TROMETHAMINE 15 MG: 30 INJECTION, SOLUTION INTRAMUSCULAR; INTRAVENOUS at 22:42

## 2023-08-12 VITALS
HEART RATE: 70 BPM | TEMPERATURE: 98.1 F | DIASTOLIC BLOOD PRESSURE: 60 MMHG | WEIGHT: 198 LBS | OXYGEN SATURATION: 99 % | HEIGHT: 66 IN | SYSTOLIC BLOOD PRESSURE: 109 MMHG | BODY MASS INDEX: 31.82 KG/M2 | RESPIRATION RATE: 17 BRPM

## 2023-08-12 RX ORDER — CEFTRIAXONE 1 G/50ML
1000 INJECTION, SOLUTION INTRAVENOUS ONCE
Status: COMPLETED | OUTPATIENT
Start: 2023-08-12 | End: 2023-08-12

## 2023-08-12 RX ORDER — CEPHALEXIN 500 MG/1
500 CAPSULE ORAL EVERY 6 HOURS SCHEDULED
Qty: 40 CAPSULE | Refills: 0 | Status: SHIPPED | OUTPATIENT
Start: 2023-08-12 | End: 2023-08-22

## 2023-08-12 RX ADMIN — CEFTRIAXONE 1000 MG: 1 INJECTION, SOLUTION INTRAVENOUS at 00:14

## 2023-08-12 NOTE — DISCHARGE INSTRUCTIONS
CT scan results as we discussed are below. Please follow-up with your PCP as soon as possible. IMPRESSION:     1. Nonobstructing 6 mm calculus in the midpole of the left kidney. Small geographic area of ill-defined low-attenuation in the midpole of the left kidney parapelvic region is seen of indeterminate etiology. Focal pyelonephritis is certainly a   possibility. Recommend clinical correlation and follow-up imaging after adequate therapy to ensure resolution and exclude possibility of underlying infiltrative mass. Subtle wall thickening and mucosal hyperenhancement of the left renal pelvis and left   ureter with hazy periureteral stranding noted. Findings likely indicate left pyelitis/ureteritis. 2.  Mild nonspecific distention of the proximal appendix as detailed above, recommend clinical correlation. No evidence for bowel obstruction, mesenteric inflammation, free air, free fluid, or loculated fluid collections in the abdomen/pelvis. 3.  2.2 cm ovoid hypodensity in the right adnexa could represent small ovarian cyst or dominant follicle/corpus luteum.

## 2023-08-12 NOTE — ED PROVIDER NOTES
History  Chief Complaint   Patient presents with   • Flank Pain     PT "So I started having pain in my left side like maybe yesterday. I noticed when I sat down I was having pain in the lower left side and then it has just gotten worse." Pt denies CP,SOB, n/v, diarrhea       66-year-old female presents to the emergency department for evaluation of abdominal pain. The patient reports developing pain to her lower abdomen and left flank starting yesterday. Her pain continued today and she felt like she was having fevers so came to the emergency department for further evaluation. She describes the pain as dull and achy in nature. States that it is better when she is sitting up and worse when she is laying down flat. Denies having history of similar symptoms. No nausea, vomiting, diarrhea, sick contacts, recent travel, decreased appetite, recent trauma or falls and dysuria. She did not take any medications to treat her symptoms prior to arrival.          Prior to Admission Medications   Prescriptions Last Dose Informant Patient Reported? Taking? Blood Glucose Monitoring Suppl (OneTouch Verio Reflect) w/Device KIT  Self No No   Sig: Dispense 1 kit per insurance formulary. GDM. Lancets (OneTouch Delica Plus [id number]) MISC  Self No No   Sig: Use 4 day. GDM.    acetaminophen (TYLENOL) 325 mg tablet   No No   Sig: Take 2 tablets (650 mg total) by mouth every 4 (four) hours as needed for mild pain   Patient not taking: Reported on 3/9/2023   calcium carbonate (TUMS) 500 mg chewable tablet   No No   Sig: Chew 2 tablets (1,000 mg total) daily as needed for indigestion or heartburn   Patient not taking: Reported on 3/9/2023   cyclobenzaprine (FLEXERIL) 10 mg tablet Past Week  No Yes   Sig: Take 1 tablet (10 mg total) by mouth daily at bedtime as needed for muscle spasms for up to 40 doses   docusate sodium (COLACE) 100 mg capsule   No No   Sig: Take 1 capsule (100 mg total) by mouth 2 (two) times a day   Patient not taking: Reported on 3/9/2023   ferrous sulfate 324 (65 Fe) mg   No Yes   Sig: Take 1 tablet (324 mg total) by mouth 2 (two) times a day before meals   fluticasone (FLONASE) 50 mcg/act nasal spray   No No   Si spray into each nostril daily Do not start before 2023. Patient not taking: Reported on 3/9/2023   glucose blood (OneTouch Verio) test strip  Self No No   Sig: Test 4 times a day. GDM. methocarbamol (ROBAXIN) 500 mg tablet Unknown  No No   Sig: Take 1 tablet (500 mg total) by mouth 4 (four) times a day      Facility-Administered Medications: None       Past Medical History:   Diagnosis Date   • Gestational diabetes mellitus (GDM) in second trimester 2023   • Urinary tract infection with hematuria 2018       Past Surgical History:   Procedure Laterality Date   • ABDOMINOPLASTY     • CORRECTION HAMMER TOE Right    • TOE SURGERY Right        Family History   Problem Relation Age of Onset   • No Known Problems Mother    • No Known Problems Father    • No Known Problems Brother    • No Known Problems Brother    • No Known Problems Brother    • No Known Problems Brother    • Diabetes Maternal Grandmother    • Diabetes Maternal Aunt    • Diabetes Maternal Uncle      I have reviewed and agree with the history as documented. E-Cigarette/Vaping   • E-Cigarette Use Former User    • Quit Date 22    • Comments Vaping      E-Cigarette/Vaping Substances   • Nicotine Yes    • THC No    • CBD No    • Flavoring No    • Other No    • Unknown No      Social History     Tobacco Use   • Smoking status: Former     Types: Cigars     Quit date: 3/15/2005     Years since quittin.4   • Smokeless tobacco: Never   • Tobacco comments:     black and milds, occassionally   Vaping Use   • Vaping Use: Former   • Quit date: 2022   • Substances: Nicotine   Substance Use Topics   • Alcohol use:  Yes     Alcohol/week: 2.0 standard drinks of alcohol     Types: 2 Glasses of wine per week     Comment: occasionally   • Drug use: No       Review of Systems   Constitutional: Negative for chills and fever. HENT: Negative for ear pain and sore throat. Eyes: Negative for pain and visual disturbance. Respiratory: Negative for cough and shortness of breath. Cardiovascular: Negative for chest pain and palpitations. Gastrointestinal: Positive for abdominal pain. Negative for vomiting. Genitourinary: Positive for flank pain. Negative for dysuria and hematuria. Musculoskeletal: Negative for arthralgias and back pain. Skin: Negative for color change and rash. Neurological: Negative for seizures and syncope. All other systems reviewed and are negative. Physical Exam  Physical Exam  Vitals and nursing note reviewed. Constitutional:       General: She is not in acute distress. Appearance: She is well-developed. HENT:      Head: Normocephalic and atraumatic. Right Ear: External ear normal.      Left Ear: External ear normal.      Nose: Nose normal.   Eyes:      Conjunctiva/sclera: Conjunctivae normal.   Cardiovascular:      Rate and Rhythm: Regular rhythm. Tachycardia present. Heart sounds: No murmur heard. Pulmonary:      Effort: Pulmonary effort is normal. No respiratory distress. Breath sounds: Normal breath sounds. Abdominal:      Palpations: Abdomen is soft. Tenderness: There is abdominal tenderness in the suprapubic area. There is left CVA tenderness. There is no guarding or rebound. Negative signs include Rovsing's sign, McBurney's sign, psoas sign and obturator sign. Musculoskeletal:         General: No swelling. Cervical back: Neck supple. Skin:     General: Skin is warm and dry. Capillary Refill: Capillary refill takes less than 2 seconds. Neurological:      Mental Status: She is alert.    Psychiatric:         Mood and Affect: Mood normal.         Vital Signs  ED Triage Vitals [08/11/23 2207]   Temperature Pulse Respirations Blood Pressure SpO2 (!) 100.9 °F (38.3 °C) (!) 108 18 133/83 99 %      Temp Source Heart Rate Source Patient Position - Orthostatic VS BP Location FiO2 (%)   Oral Monitor Sitting Left arm --      Pain Score       6           Vitals:    08/11/23 2207 08/11/23 2246 08/11/23 2327 08/12/23 0113   BP: 133/83 110/72 111/58 109/60   Pulse: (!) 108 95 88 70   Patient Position - Orthostatic VS: Sitting Sitting Sitting Sitting         Visual Acuity      ED Medications  Medications   sodium chloride 0.9 % bolus 1,000 mL (0 mL Intravenous Stopped 8/11/23 2341)   ketorolac (TORADOL) injection 15 mg (15 mg Intravenous Given 8/11/23 2242)   acetaminophen (TYLENOL) tablet 975 mg (975 mg Oral Given 8/11/23 2242)   iohexol (OMNIPAQUE) 350 MG/ML injection (SINGLE-DOSE) 100 mL (100 mL Intravenous Given 8/11/23 2327)   cefTRIAXone (ROCEPHIN) IVPB (premix in dextrose) 1,000 mg 50 mL (0 mg Intravenous Stopped 8/12/23 0044)       Diagnostic Studies  Results Reviewed     Procedure Component Value Units Date/Time    Procalcitonin [[id number]]  (Normal) Collected: 08/11/23 2234    Lab Status: Final result Specimen: Blood from Arm, Left Updated: 08/11/23 2308     Procalcitonin 0.10 ng/ml     Comprehensive metabolic panel [[id number]]  (Abnormal) Collected: 08/11/23 2234    Lab Status: Final result Specimen: Blood from Arm, Left Updated: 08/11/23 2302     Sodium 131 mmol/L      Potassium 3.3 mmol/L      Chloride 98 mmol/L      CO2 22 mmol/L      ANION GAP 11 mmol/L      BUN 7 mg/dL      Creatinine 0.85 mg/dL      Glucose 102 mg/dL      Calcium 9.5 mg/dL      AST 10 U/L      ALT 6 U/L      Alkaline Phosphatase 84 U/L      Total Protein 8.1 g/dL      Albumin 4.3 g/dL      Total Bilirubin 0.82 mg/dL      eGFR 86 ml/min/1.73sq m     Narrative:      [place] guidelines for Chronic Kidney Disease (CKD):   •  Stage 1 with normal or high GFR (GFR > 90 mL/min/1.73 square meters)  •  Stage 2 Mild CKD (GFR = 60-89 mL/min/1.73 square meters)  • Stage 3A Moderate CKD (GFR = 45-59 mL/min/1.73 square meters)  •  Stage 3B Moderate CKD (GFR = 30-44 mL/min/1.73 square meters)  •  Stage 4 Severe CKD (GFR = 15-29 mL/min/1.73 square meters)  •  Stage 5 End Stage CKD (GFR <15 mL/min/1.73 square meters)  Note: GFR calculation is accurate only with a steady state creatinine    Lipase [[id number]]  (Normal) Collected: 08/11/23 2234    Lab Status: Final result Specimen: Blood from Arm, Left Updated: 08/11/23 2302     Lipase 12 u/L     Lactic acid, plasma (w/reflex if result > 2.0) [[id number]]  (Normal) Collected: 08/11/23 2234    Lab Status: Final result Specimen: Blood from Arm, Left Updated: 08/11/23 2301     LACTIC ACID 0.8 mmol/L     Narrative:      Result may be elevated if tourniquet was used during collection. Urine Microscopic [[id number]]  (Abnormal) Collected: 08/11/23 2235    Lab Status: Final result Specimen: Urine, Clean Catch Updated: 08/11/23 2251     RBC, UA 30-50 /hpf      WBC, UA Innumerable /hpf      Epithelial Cells Occasional /hpf      Bacteria, UA Moderate /hpf      MUCUS THREADS Occasional    Urine culture [[id number]] Collected: 08/11/23 2235    Lab Status:  In process Specimen: Urine, Clean Catch Updated: 08/11/23 2251    UA w Reflex to Microscopic w Reflex to Culture [[id number]]  (Abnormal) Collected: 08/11/23 2235    Lab Status: Final result Specimen: Urine, Clean Catch Updated: 08/11/23 2244     Color, UA Yellow     Clarity, UA Cloudy     Specific Gravity, UA 1.010     pH, UA 6.0     Leukocytes, UA 2+     Nitrite, UA Positive     Protein, UA 1+ mg/dl      Glucose, UA Negative mg/dl      Ketones, UA 15 (1+) mg/dl      Urobilinogen, UA 1.0 E.U./dl      Bilirubin, UA Negative     Occult Blood, UA 2+    CBC and differential [[id number]]  (Abnormal) Collected: 08/11/23 2234    Lab Status: Final result Specimen: Blood from Arm, Left Updated: 08/11/23 2241     WBC 10.66 Thousand/uL      RBC 4.65 Million/uL      Hemoglobin 11.0 g/dL      Hematocrit 34.5 %      MCV 74 fL      MCH 23.7 pg      MCHC 31.9 g/dL      RDW 21.7 %      MPV 8.5 fL      Platelets 999 Thousands/uL      nRBC 0 /100 WBCs      Neutrophils Relative 74 %      Immat GRANS % 0 %      Lymphocytes Relative 15 %      Monocytes Relative 11 %      Eosinophils Relative 0 %      Basophils Relative 0 %      Neutrophils Absolute 7.89 Thousands/µL      Immature Grans Absolute 0.03 Thousand/uL      Lymphocytes Absolute 1.56 Thousands/µL      Monocytes Absolute 1.13 Thousand/µL      Eosinophils Absolute 0.01 Thousand/µL      Basophils Absolute 0.04 Thousands/µL     POCT pregnancy, urine [[id number]]  (Normal) Resulted: 08/11/23 2234    Lab Status: Final result Updated: 08/11/23 2234     EXT Preg Test, Ur Negative     Control Valid                 CT abdomen pelvis with contrast   Final Result by Eve Leblanc MD (08/12 0049)      1. Nonobstructing 6 mm calculus in the midpole of the left kidney. Small geographic area of ill-defined low-attenuation in the midpole of the left kidney parapelvic region is seen of indeterminate etiology. Focal pyelonephritis is certainly a    possibility. Recommend clinical correlation and follow-up imaging after adequate therapy to ensure resolution and exclude possibility of underlying infiltrative mass. Subtle wall thickening and mucosal hyperenhancement of the left renal pelvis and left    ureter with hazy periureteral stranding noted. Findings likely indicate left pyelitis/ureteritis. 2.  Mild nonspecific distention of the proximal appendix as detailed above, recommend clinical correlation. No evidence for bowel obstruction, mesenteric inflammation, free air, free fluid, or loculated fluid collections in the abdomen/pelvis. 3.  2.2 cm ovoid hypodensity in the right adnexa could represent small ovarian cyst or dominant follicle/corpus luteum.          Workstation performed: [id number]                    Procedures  Procedures         ED Course  ED Course as of 08/12/23 2334   Sat Aug 12, 2023   0055 Patient reevaluated and updated on all diagnostic studies including incidental findings. Patient reports significant improvement of symptoms. Patient with no right lower quadrant tenderness to palpation. Suprapubic and left flank tenderness improved. Initial Sepsis Screening     Row Name 08/12/23 0040                Is the patient's history suggestive of a new or worsening infection? Yes (Proceed)  -KF        Suspected source of infection urinary tract infection  -KF        Indicate SIRS criteria Tachycardia > 90 bpm  -KF        Are two or more of the above signs & symptoms of infection both present and new to the patient? No  -KF              User Key  (r) = Recorded By, (t) = Taken By, (c) = Cosigned By    [address] [facility] Name Provider Phan Muñoz MD Physician                              Medical Decision Making  68-year-old female presented to the emergency department for evaluation of suprapubic abdominal and left flank pain. On arrival the patient was awake, alert, oriented and in no acute distress. Initial vital signs show that the patient was febrile and tachycardic. On exam the patient had suprapubic and left CVA tenderness to palpation. Physical exam was otherwise unremarkable. No peritoneal signs were present. No McBurney's point tenderness. The patient was treated symptomatically with a fluid bolus, Toradol and Tylenol. CT scan of the patient's abdomen was ordered to evaluate for acute abdominal/pelvic pathology including but not limited to obstruction, perforation, pyelonephritis. Blood work showed the patient had a mild leukocytosis. Urinalysis was consistent with a urinary tract infection. CT scan with findings consistent with pyelonephritis.   Patient was also noted to have a nonobstructing 6 mm infrarenal calculus, a right adnexal hypodensity consistent with an ovarian cyst and nonspecific distention of the proximal appendix. All findings were discussed with the patient in detail including incidental findings. Physical exam not consistent with appendicitis. On reevaluation the patient reported significant improvement of her symptoms. The patient was treated with a dose of IV Rocephin for pyelonephritis here in the emergency department. She is appropriate for discharge. Recommendation was made for the patient to follow-up with her PCP as soon as possible. Patient also recommended to have repeat imaging for resolution per radiology recommendation. Patient was provided with a prescription for a 10-day course of Keflex. Strict return precautions were discussed including development of right lower quadrant pain and worsening symptoms. Patient agrees with the plan for discharge and feels comfortable to go home with proper f/u. Advised to return for worsening or additional problems. Diagnostic tests were reviewed and questions answered. Diagnosis, care plan and treatment options were discussed. The patient understands instructions and will follow up as directed. Pyelonephritis: acute illness or injury  Right ovarian cyst: acute illness or injury  Amount and/or Complexity of Data Reviewed  Labs: ordered. Radiology: ordered. Risk  OTC drugs. Prescription drug management. Disposition  Final diagnoses:   Pyelonephritis   Right ovarian cyst   Stone in kidney     Time reflects when diagnosis was documented in both MDM as applicable and the Disposition within this note     Time User Action Codes Description Comment    8/12/2023 12:53 AM Olivia Karma Add [N12] Pyelonephritis     8/12/2023 12:56 AM Olivia Karma Add [W34.880] Right ovarian cyst     8/12/2023  1:20 AM Olivia Karma Add [N20.0] Stone in kidney       ED Disposition     ED Disposition   Discharge    Condition   Stable    Date/Time   Sat Aug 12, 2023 12:53 AM    Nilay Love discharge to home/self care. Follow-up Information     Follow up With Specialties Details Why Contact Info Additional Information    Oneal Dakins, MD  Schedule an appointment as soon as possible for a visit   [facility] - Internal Medicine    [phone] Emergency Medicine Go to  If symptoms worsen [address] [phone] [facility] Emergency Department, [facility] [address] [facility]          Discharge Medication List as of 8/12/2023  1:04 AM      START taking these medications    Details   cephalexin (KEFLEX) 500 mg capsule Take 1 capsule (500 mg total) by mouth every 6 (six) hours for 10 days, Starting Sat 8/12/2023, Until Tue 8/22/2023, Normal         CONTINUE these medications which have NOT CHANGED    Details   cyclobenzaprine (FLEXERIL) 10 mg tablet Take 1 tablet (10 mg total) by mouth daily at bedtime as needed for muscle spasms for up to 40 doses, Starting Wed 7/19/2023, Normal      ferrous sulfate 324 (65 Fe) mg Take 1 tablet (324 mg total) by mouth 2 (two) times a day before meals, Starting Wed 7/26/2023, Normal      acetaminophen (TYLENOL) 325 mg tablet Take 2 tablets (650 mg total) by mouth every 4 (four) hours as needed for mild pain, Starting Wed 3/8/2023, No Print      Blood Glucose Monitoring Suppl (OneTouch Verio Reflect) w/Device KIT Dispense 1 kit per insurance formulary. GDM., Normal      calcium carbonate (TUMS) 500 mg chewable tablet Chew 2 tablets (1,000 mg total) daily as needed for indigestion or heartburn, Starting Wed 3/8/2023, No Print      docusate sodium (COLACE) 100 mg capsule Take 1 capsule (100 mg total) by mouth 2 (two) times a day, Starting Wed 3/8/2023, No Print      fluticasone (FLONASE) 50 mcg/act nasal spray 1 spray into each nostril daily Do not start before March 9, 2023., Starting Thu 3/9/2023, No Print      glucose blood (OneTouch Verio) test strip Test 4 times a day. GDM. , Normal      Lancets (OneTouch Delica Plus [id number]) MISC Use 4 day. GDM., Normal      methocarbamol (ROBAXIN) 500 mg tablet Take 1 tablet (500 mg total) by mouth 4 (four) times a day, Starting Mon 7/31/2023, Normal             No discharge procedures on file.     PDMP Review     None          ED Provider  Electronically Signed by           Lucia Josue MD  08/12/23 9711

## 2023-08-14 ENCOUNTER — APPOINTMENT (OUTPATIENT)
Dept: PHYSICAL THERAPY | Facility: REHABILITATION | Age: 38
End: 2023-08-14
Payer: MEDICARE

## 2023-08-14 LAB — BACTERIA UR CULT: ABNORMAL

## 2023-08-17 ENCOUNTER — OFFICE VISIT (OUTPATIENT)
Dept: PHYSICAL THERAPY | Facility: REHABILITATION | Age: 38
End: 2023-08-17
Payer: MEDICARE

## 2023-08-17 DIAGNOSIS — M54.50 LUMBAR BACK PAIN: Primary | ICD-10-CM

## 2023-08-17 DIAGNOSIS — M54.2 CERVICAL PAIN (NECK): ICD-10-CM

## 2023-08-17 PROCEDURE — 97112 NEUROMUSCULAR REEDUCATION: CPT

## 2023-08-17 PROCEDURE — 97110 THERAPEUTIC EXERCISES: CPT
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
## 2023-08-21 ENCOUNTER — OFFICE VISIT (OUTPATIENT)
Dept: PHYSICAL THERAPY | Facility: REHABILITATION | Age: 38
End: 2023-08-21
Payer: MEDICARE

## 2023-08-21 DIAGNOSIS — M54.2 CERVICAL PAIN (NECK): ICD-10-CM

## 2023-08-21 DIAGNOSIS — M54.50 LUMBAR BACK PAIN: Primary | ICD-10-CM

## 2023-08-21 PROCEDURE — 97112 NEUROMUSCULAR REEDUCATION: CPT

## 2023-08-21 PROCEDURE — 97110 THERAPEUTIC EXERCISES: CPT
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
## 2023-08-24 ENCOUNTER — APPOINTMENT (OUTPATIENT)
Dept: PHYSICAL THERAPY | Facility: REHABILITATION | Age: 38
End: 2023-08-24
Payer: MEDICARE

## 2023-08-24 DIAGNOSIS — M54.2 CERVICAL PAIN (NECK): ICD-10-CM

## 2023-08-24 DIAGNOSIS — M54.50 LUMBAR BACK PAIN: Primary | ICD-10-CM

## 2023-08-25 ENCOUNTER — OFFICE VISIT (OUTPATIENT)
Dept: PHYSICAL THERAPY | Facility: REHABILITATION | Age: 38
End: 2023-08-25
Payer: MEDICARE

## 2023-08-25 DIAGNOSIS — M54.2 CERVICAL PAIN (NECK): ICD-10-CM

## 2023-08-25 DIAGNOSIS — M54.50 LUMBAR BACK PAIN: Primary | ICD-10-CM

## 2023-08-25 PROCEDURE — 97110 THERAPEUTIC EXERCISES: CPT | Performed by: PHYSICAL THERAPIST

## 2023-08-25 PROCEDURE — 97112 NEUROMUSCULAR REEDUCATION: CPT | Performed by: PHYSICAL THERAPIST
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
## 2023-08-28 ENCOUNTER — OFFICE VISIT (OUTPATIENT)
Dept: PHYSICAL THERAPY | Facility: REHABILITATION | Age: 38
End: 2023-08-28
Payer: MEDICARE

## 2023-08-28 ENCOUNTER — PROCEDURE VISIT (OUTPATIENT)
Dept: FAMILY MEDICINE CLINIC | Facility: CLINIC | Age: 38
End: 2023-08-28
Payer: MEDICARE

## 2023-08-28 DIAGNOSIS — M54.2 CERVICAL PAIN: ICD-10-CM

## 2023-08-28 DIAGNOSIS — M99.01 SOMATIC DYSFUNCTION OF CERVICAL REGION: ICD-10-CM

## 2023-08-28 DIAGNOSIS — M54.2 CERVICAL PAIN (NECK): ICD-10-CM

## 2023-08-28 DIAGNOSIS — M54.50 LUMBAR BACK PAIN: Primary | ICD-10-CM

## 2023-08-28 DIAGNOSIS — M99.03 SOMATIC DYSFUNCTION OF LUMBAR REGION: ICD-10-CM

## 2023-08-28 PROCEDURE — 98926 OSTEOPATH MANJ 3-4 REGIONS: CPT

## 2023-08-28 PROCEDURE — 97110 THERAPEUTIC EXERCISES: CPT | Performed by: PHYSICAL THERAPIST

## 2023-08-28 PROCEDURE — 97112 NEUROMUSCULAR REEDUCATION: CPT | Performed by: PHYSICAL THERAPIST
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
## 2023-08-28 NOTE — PROGRESS NOTES
The Assessment/Plan     This is a 42 y.o. female who presents for OMT follow-up for:  1. Lumbar back pain  XR spine lumbar minimum 4 views non injury      2. Cervical pain  XR spine cervical complete 4 or 5 vw non injury      3. Somatic dysfunction of cervical region        4. Somatic dysfunction of lumbar region             1. Patient tolerated OMT well for the above problems,  advised patient to drink fluids and can use NSAID for soreness after treatment     2. OMT Follow up in 4 weeks. Jose De Jesus Hammond is a 42 y.o. female and is here for a OMT follow up. The patient reports low back pain and right sided neck pain have not significantly improved since her last OMT visit on 7/31. She has started physical therapy and has been to five sessions. Does not believe she has had much relief of her back and neck pain. Continues to have some pain in her left shoulder that is neither relieved or improved with movement. Denies any numbness or tingling. The pain in her shoulder does improve with Ibuprofen. She denies any significant improvement in her pain from taking Robaxin. She takes it twice daily. Is the patient taking Pain medication? yes  Has the patient completed physical therapy for this condition? yes  Did Patient symptoms improve from last OMT appointment? no    The following portions of the patient's history were reviewed and updated as appropriate: allergies, current medications, past family history, past medical history, past social history, past surgical history and problem list.    Review of Systems  Review of Systems   Musculoskeletal: Positive for arthralgias, back pain, myalgias, neck pain and neck stiffness.          Objective     OMT Exam     OMT    Performed by: Paula Cowan DO  Authorized by: Paula Cowan DO      Procedure Details:     Region evaluated and treated:  Cervical, Sacrum/Pelvis, Lumbar and Thoracic    Thoracic Information  Thoracic Region: T5 - T9  Cervical Details:     Examination Method:  Tissue Texture Change, Stability, Laxity, Effusions, Tone, Range of Motion, Contracture, Asymmetry, Misalignment, Crepitation, Defects, Masses and Tenderness, Pain    Severity:  Moderate    Osteopathic Findings:  Cervical paraspinal hypertonicity L > R, tender points, restriction with transition left to right    Treatment Method:  Muscle Energy Treatment, Myofascial Release Treatment, Soft Tissue Treatment and Balanced Ligamentous Tension, Ligamentous Articular Strain Treatment    Response:  Improved - The somatic dysfunction is improved but not completely resolved. Thoracic T5 - T9 details:     Examination Method:  Tissue Texture Change, Stability, Laxity, Effusions, Tone, Range of Motion, Contracture, Asymmetry, Misalignment, Crepitation, Defects, Masses and Tenderness, Pain    Severity:  Moderate    Osteopathic Findings:  T5-T9 rotated right, sidebent left    Treatment Method:  Muscle Energy Treatment, Soft Tissue Treatment, Myofascial Release Treatment and High Velocity, Low Amplitude Treatment    Response:  Improved - The somatic dysfunction is improved but not completely resolved. Lumbar details:     Examination Method:  Tissue Texture Change, Stability, Laxity, Effusions, Tone, Range of Motion, Contracture, Asymmetry, Misalignment, Crepitation, Defects, Masses and Tenderness, Pain    Severity:  Moderate    Osteopathic Findings:  Paraspinal hypertonicity, tender points, R > L psoas tender point    Treatment Method:  Muscle Energy Treatment, Myofascial Release Treatment, Counterstrain Treatment and Balanced Ligamentous Tension, Ligamentous Articular Strain Treatment    Response:  Improved - The somatic dysfunction is improved but not completely resolved.     Sacrum/Pelvis details:     Examination Method:  Tissue Texture Change, Stability, Laxity, Effusions, Tone, Range of Motion, Contracture, Asymmetry, Misalignment, Crepitation, Defects, Masses and Tenderness, Pain Severity:  Mild    Osteopathic Findings:  Iliac crest tender points    Treatment Method:  Myofascial Release Treatment and Soft Tissue Treatment    Response:  Improved - The somatic dysfunction is improved but not completely resolved.     Total Regions Treated:  4

## 2023-08-31 ENCOUNTER — OFFICE VISIT (OUTPATIENT)
Dept: PHYSICAL THERAPY | Facility: REHABILITATION | Age: 38
End: 2023-08-31
Payer: MEDICARE

## 2023-08-31 DIAGNOSIS — M54.50 LUMBAR BACK PAIN: Primary | ICD-10-CM

## 2023-08-31 DIAGNOSIS — M54.2 CERVICAL PAIN (NECK): ICD-10-CM

## 2023-08-31 PROCEDURE — 97110 THERAPEUTIC EXERCISES: CPT | Performed by: PHYSICAL THERAPIST

## 2023-08-31 PROCEDURE — 97112 NEUROMUSCULAR REEDUCATION: CPT | Performed by: PHYSICAL THERAPIST
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
## 2023-09-05 ENCOUNTER — APPOINTMENT (OUTPATIENT)
Dept: PHYSICAL THERAPY | Facility: REHABILITATION | Age: 38
End: 2023-09-05
Payer: MEDICARE

## 2023-09-07 ENCOUNTER — OFFICE VISIT (OUTPATIENT)
Dept: PHYSICAL THERAPY | Facility: REHABILITATION | Age: 38
End: 2023-09-07
Payer: MEDICARE

## 2023-09-07 DIAGNOSIS — M54.50 LUMBAR BACK PAIN: Primary | ICD-10-CM

## 2023-09-07 DIAGNOSIS — M54.2 CERVICAL PAIN (NECK): ICD-10-CM

## 2023-09-07 PROCEDURE — 97112 NEUROMUSCULAR REEDUCATION: CPT

## 2023-09-07 PROCEDURE — 97110 THERAPEUTIC EXERCISES: CPT
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
## 2023-09-11 ENCOUNTER — OFFICE VISIT (OUTPATIENT)
Dept: PHYSICAL THERAPY | Facility: REHABILITATION | Age: 38
End: 2023-09-11
Payer: MEDICARE

## 2023-09-11 DIAGNOSIS — M54.2 CERVICAL PAIN (NECK): ICD-10-CM

## 2023-09-11 DIAGNOSIS — M54.50 LUMBAR BACK PAIN: Primary | ICD-10-CM

## 2023-09-11 PROCEDURE — 97112 NEUROMUSCULAR REEDUCATION: CPT

## 2023-09-11 PROCEDURE — 97110 THERAPEUTIC EXERCISES: CPT
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
## 2023-09-12 ENCOUNTER — OFFICE VISIT (OUTPATIENT)
Dept: PHYSICAL THERAPY | Facility: REHABILITATION | Age: 38
End: 2023-09-12
Payer: MEDICARE

## 2023-09-12 DIAGNOSIS — M54.2 CERVICAL PAIN (NECK): ICD-10-CM

## 2023-09-12 DIAGNOSIS — M54.50 LUMBAR BACK PAIN: Primary | ICD-10-CM

## 2023-09-12 PROCEDURE — 97140 MANUAL THERAPY 1/> REGIONS: CPT | Performed by: PHYSICAL THERAPIST

## 2023-09-12 PROCEDURE — 97112 NEUROMUSCULAR REEDUCATION: CPT | Performed by: PHYSICAL THERAPIST

## 2023-09-12 PROCEDURE — 97110 THERAPEUTIC EXERCISES: CPT | Performed by: PHYSICAL THERAPIST
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
## 2023-09-14 ENCOUNTER — APPOINTMENT (OUTPATIENT)
Dept: PHYSICAL THERAPY | Facility: REHABILITATION | Age: 38
End: 2023-09-14
Payer: MEDICARE

## 2023-09-18 ENCOUNTER — OFFICE VISIT (OUTPATIENT)
Dept: PHYSICAL THERAPY | Facility: REHABILITATION | Age: 38
End: 2023-09-18
Payer: MEDICARE

## 2023-09-18 DIAGNOSIS — M54.2 CERVICAL PAIN (NECK): ICD-10-CM

## 2023-09-18 DIAGNOSIS — M54.50 LUMBAR BACK PAIN: Primary | ICD-10-CM

## 2023-09-18 PROCEDURE — 97110 THERAPEUTIC EXERCISES: CPT

## 2023-09-18 PROCEDURE — 97112 NEUROMUSCULAR REEDUCATION: CPT
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
## 2023-09-20 ENCOUNTER — EVALUATION (OUTPATIENT)
Dept: PHYSICAL THERAPY | Facility: REHABILITATION | Age: 38
End: 2023-09-20
Payer: MEDICARE

## 2023-09-20 DIAGNOSIS — M54.2 CERVICAL PAIN (NECK): ICD-10-CM

## 2023-09-20 DIAGNOSIS — M54.50 LUMBAR BACK PAIN: Primary | ICD-10-CM

## 2023-09-20 PROCEDURE — 97110 THERAPEUTIC EXERCISES: CPT | Performed by: PHYSICAL THERAPIST

## 2023-09-20 PROCEDURE — 97112 NEUROMUSCULAR REEDUCATION: CPT | Performed by: PHYSICAL THERAPIST
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
## 2023-09-27 ENCOUNTER — OFFICE VISIT (OUTPATIENT)
Dept: PHYSICAL THERAPY | Facility: REHABILITATION | Age: 38
End: 2023-09-27
Payer: MEDICARE

## 2023-09-27 DIAGNOSIS — M54.50 LUMBAR BACK PAIN: Primary | ICD-10-CM

## 2023-09-27 DIAGNOSIS — M54.2 CERVICAL PAIN (NECK): ICD-10-CM

## 2023-09-27 PROCEDURE — 97112 NEUROMUSCULAR REEDUCATION: CPT

## 2023-09-27 PROCEDURE — 97110 THERAPEUTIC EXERCISES: CPT
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
## 2023-10-03 NOTE — RESULT ENCOUNTER NOTE
Hemoglobin electrophoresis was normal, no thalassemia or sickle cell present,  please call patient to inform     Thanks
03-Oct-2023 21:54

## 2024-03-05 ENCOUNTER — TELEMEDICINE (OUTPATIENT)
Dept: FAMILY MEDICINE CLINIC | Facility: CLINIC | Age: 39
End: 2024-03-05
Payer: MEDICARE

## 2024-03-05 DIAGNOSIS — B97.89 VIRAL SINUSITIS: Primary | ICD-10-CM

## 2024-03-05 DIAGNOSIS — J32.9 VIRAL SINUSITIS: Primary | ICD-10-CM

## 2024-03-05 PROCEDURE — 99214 OFFICE O/P EST MOD 30 MIN: CPT | Performed by: FAMILY MEDICINE

## 2024-03-05 RX ORDER — MOMETASONE FUROATE 50 UG/1
2 SPRAY, METERED NASAL DAILY
Qty: 1 ACT | Refills: 1 | Status: SHIPPED | OUTPATIENT
Start: 2024-03-05

## 2024-03-05 RX ORDER — OSELTAMIVIR PHOSPHATE 75 MG/1
75 CAPSULE ORAL EVERY 12 HOURS SCHEDULED
Qty: 10 CAPSULE | Refills: 0 | Status: SHIPPED | OUTPATIENT
Start: 2024-03-05 | End: 2024-03-10

## 2024-03-05 NOTE — PROGRESS NOTES
Virtual Regular Visit    Verification of patient location:    Patient is located at Home in the following state in which I hold an active license [place]      Assessment/Plan:    Problem List Items Addressed This Visit    None  Visit Diagnoses       Viral sinusitis    -  Primary    Relevant Medications    mometasone (NASONEX) 50 mcg/act nasal spray    oseltamivir (TAMIFLU) 75 mg capsule          Viral sinusitis with concern for influenza with positive sick contact. Prescribe tamiflu. For congestion use nasal saline rinse especially before bed and stronger nasal steroid such as nasonex or nasacort (mometasone) - insurance requires Prior authorization may need to get OTC.        Reason for visit is   Chief Complaint   Patient presents with    Virtual Regular Visit          Encounter provider Pooja Guy DO    Provider located at [address] [address] [phone]      Recent Visits  No visits were found meeting these conditions.  Showing recent visits within past 7 days and meeting all other requirements  Today's Visits  Date Type Provider Dept   03/05/24 Telemedicine Pooja Guy DO [facility]   Showing today's visits and meeting all other requirements  Future Appointments  No visits were found meeting these conditions.  Showing future appointments within next 150 days and meeting all other requirements       The patient was identified by name and date of birth. Porsche Bennett was informed that this is a telemedicine visit and that the visit is being conducted through the Epic Embedded platform. She agrees to proceed..  My office door was closed. No one else was in the room.  She acknowledged consent and understanding of privacy and security of the video platform. The patient has agreed to participate and understands they can discontinue the visit at any time.    Patient is aware this is a billable service.      Subjective  CC:congestion   Porsche Bennett is a 38 y.o. female who reports congestion, difficulty breathing through nose and facial pressure. Symptoms started 3 days ago. She is having associated coughing and sneezing.  She tried sudafed and benadryl without relief. No sick contacts. Nasal secretions are clear. She was near her sick nephews but not too close to them and they test positive for the flu. No Gi symptoms or nausea. She is having hard time sleeping. No fever. No being able to breath due to clogging and congestion is keeping her from sleeping and not the cough.         Past Medical History:   Diagnosis Date    Gestational diabetes mellitus (GDM) in second trimester 2/13/2023    Urinary tract infection with hematuria 11/5/2018       Past Surgical History:   Procedure Laterality Date    ABDOMINOPLASTY      CORRECTION HAMMER TOE Right     TOE SURGERY Right 2005       Current Outpatient Medications   Medication Sig Dispense Refill    mometasone (NASONEX) 50 mcg/act nasal spray 2 sprays into each nostril daily 1 Act 1    oseltamivir (TAMIFLU) 75 mg capsule Take 1 capsule (75 mg total) by mouth every 12 (twelve) hours for 5 days 10 capsule 0    acetaminophen (TYLENOL) 325 mg tablet Take 2 tablets (650 mg total) by mouth every 4 (four) hours as needed for mild pain (Patient not taking: Reported on 3/9/2023)  0    Blood Glucose Monitoring Suppl (OneTouch Verio Reflect) w/Device KIT Dispense 1 kit per insurance formulary. GDM. 1 kit 0    calcium carbonate (TUMS) 500 mg chewable tablet Chew 2 tablets (1,000 mg total) daily as needed for indigestion or heartburn (Patient not taking: Reported on 3/9/2023)  0    docusate sodium (COLACE) 100 mg capsule Take 1 capsule (100 mg total) by mouth 2 (two) times a day (Patient not taking: Reported on 3/9/2023)  0    ferrous sulfate 324 (65 Fe) mg Take 1 tablet (324 mg total) by mouth 2 (two) times a day before meals 240 tablet 1    glucose blood (OneTouch Verio) test  strip Test 4 times a day. GDM. 100 strip 7    Lancets (OneTouch Delica Plus [id number]) MISC Use 4 day. GDM. 100 each 7    methocarbamol (ROBAXIN) 500 mg tablet Take 1 tablet (500 mg total) by mouth 4 (four) times a day 30 tablet 0     No current facility-administered medications for this visit.        No Known Allergies    Review of Systems   Constitutional:  Positive for fatigue. Negative for chills and fever.   HENT:  Positive for congestion, rhinorrhea, sinus pressure, sinus pain and sneezing. Negative for sore throat.    Respiratory:  Positive for cough. Negative for shortness of breath.    Cardiovascular:  Negative for chest pain and palpitations.   Gastrointestinal:  Negative for abdominal pain, diarrhea, nausea and vomiting.   Neurological:  Negative for headaches.       Video Exam    There were no vitals filed for this visit.    Physical Exam  Constitutional:       Appearance: Normal appearance.   HENT:      Nose:      Comments: Nasal voice, sounds congested   Neurological:      Mental Status: She is alert and oriented to person, place, and time.   Psychiatric:         Mood and Affect: Mood normal.         Behavior: Behavior normal.          Visit Time  Total Visit Duration: 14

## 2024-03-25 DIAGNOSIS — B97.89 VIRAL SINUSITIS: ICD-10-CM

## 2024-03-25 DIAGNOSIS — J32.9 VIRAL SINUSITIS: ICD-10-CM

## 2024-03-25 RX ORDER — MOMETASONE FUROATE 50 UG/1
SPRAY, METERED NASAL
Qty: 17 ACT | Refills: 1 | Status: SHIPPED | OUTPATIENT
Start: 2024-03-25